# Patient Record
Sex: FEMALE | Race: WHITE | Employment: STUDENT | URBAN - METROPOLITAN AREA
[De-identification: names, ages, dates, MRNs, and addresses within clinical notes are randomized per-mention and may not be internally consistent; named-entity substitution may affect disease eponyms.]

---

## 2021-10-22 ENCOUNTER — HOSPITAL ENCOUNTER (OUTPATIENT)
Dept: PHYSICAL THERAPY | Age: 21
End: 2021-10-22
Payer: COMMERCIAL

## 2021-10-25 ENCOUNTER — APPOINTMENT (OUTPATIENT)
Dept: PHYSICAL THERAPY | Age: 21
End: 2021-10-25
Payer: COMMERCIAL

## 2021-10-27 ENCOUNTER — APPOINTMENT (OUTPATIENT)
Dept: PHYSICAL THERAPY | Age: 21
End: 2021-10-27
Payer: COMMERCIAL

## 2021-10-29 ENCOUNTER — HOSPITAL ENCOUNTER (OUTPATIENT)
Dept: PHYSICAL THERAPY | Age: 21
Discharge: HOME | End: 2021-10-29
Payer: COMMERCIAL

## 2021-10-29 PROCEDURE — 97110 THERAPEUTIC EXERCISES: CPT

## 2021-10-29 PROCEDURE — 97162 PT EVAL MOD COMPLEX 30 MIN: CPT

## 2021-10-29 NOTE — PROGRESS NOTES
Arline   : 2000  Primary: Aleja Hudson Rpn  Secondary:  [address] @ [address], [place].  [phone]   [phone]      OUTPATIENT PHYSICAL THERAPY: Daily Treatment Note  10/29/2021     GOALS: (Goals have been discussed and agreed upon with patient.)  Short-Term Functional Goals: Time Frame: 1 week  1. Patient will display independence with initial HEP to facilitate progress of condition. 2. Patient will note 10% improvement in capacity to walk uphill before onset of pain. Discharge Goals: Time Frame: 4 weeks  1. Patient will display normalized ROM of bilateral ankles to allow for more efficient stress pattern during ambulation. 2. Patient will display independence with final HEP to continue to progress condition. 3. Patient will display capacity to perform 15 SL Heel raises bilaterally to indicate appropriate calf strength for community ambulation. 4. Patient will demonstrate capacity to walk for two minutes on a Treadmill at max incline without pain to demonstrate improved community ambulatory capacity. _________________________________________________________________________  Pre-treatment Symptoms/Complaints:  See initial evaluation  Pain: Initial: 2/10 Post Session:  1/10   Medications Last Reviewed:  10/29/2021  Updated Objective Findings:  Below measures from initial evaluation unless otherwise noted. Observation/Orthostatic Postural Assessment:    Patient gait cycle unremarkable. Palpation:    Diffusely increased tone bilaterally throughout gastroc-soleus complex. ROM:    RLE:  CKC DF: 30 deg  PF: 55 deg  Inv.: 25 deg. Ev. 35 deg. LLE:  CKC DF: 30 deg  PF: 55 deg  Inv.: 25 deg. Ev. 35 deg. Strength:   RLE:  DF: 4+/5  PF: 4/5 (15 SL HR*)  Inv.: 4+/5  Ev. 4+/5    LLE:  DF: 4+/5  PF: 4/5 (17 SL HR*)  Inv.: 4+/5  Ev. 4+/5    Special Tests:    6MWT to be performed at next visit.    Neurological Screen:  Sensation to light touch WNL Braulio. Functional Mobility:   STS WNL. Balance:    SL Firm Flat Eyes Closed:  R: 8 sec. L: 4 sec. TREATMENT:   THERAPEUTIC ACTIVITY: ( see below for minutes): Therapeutic activities per grid below to improve mobility, strength, balance and coordination. Required minimal visual, verbal, manual and tactile cues to improve independence and safety with daily activities . THERAPEUTIC EXERCISE: (see below for minutes):  Exercises per grid below to improve mobility, strength, balance and coordination. Required minimal verbal and manual cues to promote proper body alignment, promote proper body posture and promote proper body mechanics. Progressed resistance, range, repetitions and complexity of movement as indicated. MANUAL THERAPY: (see below for minutes): Joint mobilization and Soft tissue mobilization was utilized and necessary because of the patient's restricted joint motion, painful spasm, loss of articular motion and restricted motion of soft tissue. MODALITIES: (see below for minutes):      to decrease pain    SELF CARE: (see below for minutes): Procedure(s) (per grid) utilized to improve and/or restore self-care/home management as related to dressing, bathing and grooming. Required minimal verbal cueing to facilitate activities of daily living skills and compensatory activities. Date: 10/29/21       Modalities:                                Therapeutic Exercise: 23 minutes       Slantboard Gastroc Stretch  3 x :30        Slantboard Soleus Stretch  3 x :30        Standing DF Leaning Against Wall  3 x 10        DL HR 3 x 10        Split Lunge DF with Front Foot Elevated 2 x 5                Proprioceptive Activities:                                Manual Therapy:                        Therapeutic Activities:                                  HEP: see 10/29/21 flow sheet for specifics.     [employer] Portal  Treatment/Session Summary:    · Response to Treatment: Patient is independent with performance of above described home program.  · Communication/Consultation:  None today  · Equipment provided today:  None today  · Recommendations/Intent for next treatment session: Next visit will focus on reaching out to MD, assessing tolerance to HEP and progressing mobility and endurance of lower leg musculature.     Total Treatment Billable Duration:  45 minutes  PT Patient Time In/Time Out  Time In: 7692  Time Out: Teri 29, PT    Future Appointments   Date Time Provider Khadijah Vadlez   11/1/2021  3:30 PM Clementeen Grams, PT [facility]   11/3/2021  3:30 PM Clementeen Grams, PT [facility]   11/5/2021  3:30 PM Clementeen Grams, PT [facility]   11/8/2021  3:30 PM Clementeen Grams, PT [facility]   11/10/2021  3:30 PM Clementeen Grams, PT [facility]   11/12/2021  3:30 PM Clementeen Grams, PT [facility]   11/15/2021  3:30 PM Clementeen Grams, PT [facility]   11/17/2021  3:30 PM Clementeen Grams, PT [facility]   11/19/2021  3:30 PM Clementeen Grams, PT [facility]   11/22/2021  3:30 PM Clementeen Grams, PT [facility]   11/24/2021  3:30 PM Clementeen Grams, PT [facility]   11/29/2021  3:30 PM Clementeen Grams, PT [facility]

## 2021-10-29 NOTE — THERAPY EVALUATION
Karin Mcdaniel : 2000 [id number] @ [address] [address]. [phone]   [phone] OUTPATIENT PHYSICAL THERAPY:Initial Assessment 10/29/2021 ICD-10: Treatment Diagnosis: Pain in right lower leg (M79.661), Pain in left lower leg (B83.985), Stiffness of right ankle, not elsewhere classified (M25.671) and Stiffness of left ankle, not elsewhere classified (M25.672) Precautions/Allergies:  
Patient has no allergy information on record. Seasonal 
Fall Risk Score:   
 Ambulatory/Rehab Services H2 Model Falls Risk Assessment Risk Factors: 
Click here to [employer] Ability to Rise from Chair: 
     (0)  Ability to rise in a single movement Falls Prevention Plan: No modifications necessary Total: (5 or greater = High Risk): 0  
 © [facility] [employer] Patent #[id number]. Federal Law prohibits the replication, distribution or use without written permission from [facility] MD Orders: evaluate and treat MEDICAL/REFERRING DIAGNOSIS: 
Pain in limb [M79.609] Disorder of muscle [M62.9] Other symptoms and signs involving the musculoskeletal system [R29.898] Cramp in limb [R25.2] DATE OF ONSET: 2021 REFERRING PHYSICIAN: Julio Kang, * RETURN PHYSICIAN APPOINTMENT: 2021 INITIAL ASSESSMENT:  Ms. Taylor Lind presents to clinic with complaints of insidious bilateral leg pain. Patient complains of pain and difficulty with the following functional activities: walking uphill, walking for prolonged periods in time, tasks requiring heel elevation. PROBLEM LIST (Impacting functional limitations): 1. Decreased Strength 2. Decreased ADL/Functional Activities 3. Decreased Ambulation Ability/Technique 4. Decreased Balance 5. Increased Pain 6. Decreased Activity Tolerance 7. Increased Fatigue 8. Decreased Flexibility/Joint Mobility 9.  Decreased [place] with Home Exercise Program INTERVENTIONS PLANNED: 
1. Balance Exercise 2. Gait Training 3. Home Exercise Program (HEP) 4. Manual Therapy 5. Neuromuscular Re-education/Strengthening 6. Range of Motion (ROM) 7. Therapeutic Activites 8. Therapeutic Exercise/Strengthening TREATMENT PLAN: 
Effective Dates: 10/29/2021 TO 11/28/2021 (30 days). Frequency/Duration: 3 times a week for 30 Days GOALS: (Goals have been discussed and agreed upon with patient.) Short-Term Functional Goals: Time Frame: 1 week 1. Patient will display independence with initial HEP to facilitate progress of condition. 2. Patient will note 10% improvement in capacity to walk uphill before onset of pain. Discharge Goals: Time Frame: 4 weeks 1. Patient will display normalized ROM of bilateral ankles to allow for more efficient stress pattern during ambulation. 2. Patient will display independence with final HEP to continue to progress condition. 3. Patient will display capacity to perform 15 SL Heel raises bilaterally to indicate appropriate calf strength for community ambulation. 4. Patient will demonstrate capacity to walk for two minutes on a Treadmill at max incline without pain to demonstrate improved community ambulatory capacity. Rehabilitation Potential For Stated Goals: Fair Regarding Arline Turner's therapy, I certify that the treatment plan above will be carried out by a therapist or under their direction. Thank you for this referral, 
Rodrigo Romeo PT Referring Physician Signature: Michael De La Rosa, *              Date HISTORY:  
History of Present Injury/Illness (Reason for Referral): 
Patient presents to clinic with complaints of chronic bilateral leg pain of insidious origin. Patient reports pain began when she returned to campus in 8/2021 shortly after her most recent MD follow up that went well.  Patient reports she noticed pain when walking uphill and describes pain as heavy/numb sensation that is diffuse and can progress to tingly when the temperature. Notes she has underwent one heart surgery in 2002 and outline history of condition Tetralogy of Fallot. Is unsure if leg pain is vascular in nature or muscular and has yet to return to MD managing condition as he is in [place]. States she received full workup at last [facility] in 8/2021 with nothing new to report and had not previously experienced these symptoms. Patient (through discussion) urged to set up appointment with MD when she will be home over break in December. Denies medication changes or sudden increase in activity. States she has always dealt with calf tightness and generalized fatigue/activity restrictions secondary to heart condition but this is new. Goals for PT are to increase capacity to navigate hills on campus and discern origin of problem. Past Medical History/Comorbidities: Ms. Sumanth Poole  has no past medical history on file. Ms. Sumanth Poole  has no past surgical history on file. Tetralogy of Fallot. Heart Surgery 2002. Social History/Living Environment:  
  Lives in apartment with 12 YRN. Prior Level of Function/Work/Activity: 
Light activity restrictions secondary to heart condition but otherwise no restrictions of PLOF. Dominant Side:  
      RIGHT Current Medications:  No current outpatient medications on file. Date Last Reviewed:  10/29/2021 # of Personal Factors/Comorbidities that affect the Plan of Care: 1-2: MODERATE COMPLEXITY EXAMINATION:  
Observation/Orthostatic Postural Assessment:   
Patient gait cycle unremarkable. Palpation:   
Diffusely increased tone bilaterally throughout gastroc-soleus complex. ROM:   
RLE: 
CKC DF: 30 deg PF: 55 deg Inv.: 25 deg. Ev. 35 deg. LLE: 
CKC DF: 30 deg PF: 55 deg Inv.: 25 deg. Ev. 35 deg. Strength:  
RLE: 
DF: 4+/5 PF: 4/5 (15 SL HR*) 
Inv.: 4+/5 Ev. 4+/5 LLE: 
DF: 4+/5 PF: 4/5 (17 SL HR*) 
Inv.: 4+/5 Ev. 4+/5 Special Tests: 6MWT to be performed at next visit. Neurological Screen: 
Sensation to light touch WNL Braulio. Functional Mobility: STS WNL. Balance:   
SL Firm Flat Eyes Closed: 
R: 8 sec. L: 4 sec. Body Structures Involved: 1. Nerves 2. Bones 3. Joints 4. Muscles 5. Ligaments Body Functions Affected: 1. Sensory/Pain 2. Neuromusculoskeletal 
3. Movement Related Activities and Participation Affected: 1. General Tasks and Demands 2. Mobility 3. Self Care 4. Domestic Life 5. Interpersonal Interactions and Relationships 6. Community, Social and [place] # of elements that affect the Plan of Care: 3: MODERATE COMPLEXITY CLINICAL PRESENTATION:  
Presentation: Evolving clinical presentation with changing clinical characteristics: MODERATE COMPLEXITY CLINICAL DECISION MAKING:  
Tool Used: Lower Extremity Functional Scale (LEFS) Score:  Initial: 72/80 Most Recent: X/80 (Date: -- ) Interpretation of Score: 20 questions each scored on a 5 point scale with 0 representing \"extreme difficulty or unable to perform\" and 4 representing \"no difficulty\". The lower the score, the greater the functional disability. 80/80 represents no disability. Minimal detectable change is 9 points. Medical Necessity:  
· Patient is expected to demonstrate progress in strength, range of motion, balance and coordination ·  to increase independence with navigating inclined surfaces. · . Reason for Services/Other Comments: 
· Patient has demonstrated an improvement in functional level by independent performance of HEP. · . Use of outcome tool(s) and clinical judgement create a POC that gives a: Questionable prediction of patient's progress: MODERATE COMPLEXITY Total Treatment Duration: 45 minutes Stacy Earl, PT

## 2021-11-01 ENCOUNTER — HOSPITAL ENCOUNTER (OUTPATIENT)
Dept: PHYSICAL THERAPY | Age: 21
Discharge: HOME | End: 2021-11-01
Payer: COMMERCIAL

## 2021-11-01 PROCEDURE — 97110 THERAPEUTIC EXERCISES: CPT

## 2021-11-01 PROCEDURE — 97112 NEUROMUSCULAR REEDUCATION: CPT

## 2021-11-01 NOTE — PROGRESS NOTES
Arline   : 2000  Primary: Sosa Hudson Rpn  Secondary:  [address] @ [address] [address].  [phone]   [phone]      OUTPATIENT PHYSICAL THERAPY: Daily Treatment Note  2021    ICD-10: Treatment Diagnosis: Pain in right lower leg (M79.661), Pain in left lower leg (M79.662), Stiffness of right ankle, not elsewhere classified (M25.671) and Stiffness of left ankle, not elsewhere classified (G79.832)    Effective Dates: 10/29/2021 TO 2021 (30 days). Frequency/Duration: 3 times a week for 30 Days    GOALS: (Goals have been discussed and agreed upon with patient.)  Short-Term Functional Goals: Time Frame: 1 week  1. Patient will display independence with initial HEP to facilitate progress of condition. 2. Patient will note 10% improvement in capacity to walk uphill before onset of pain. Discharge Goals: Time Frame: 4 weeks  1. Patient will display normalized ROM of bilateral ankles to allow for more efficient stress pattern during ambulation. 2. Patient will display independence with final HEP to continue to progress condition. 3. Patient will display capacity to perform 15 SL Heel raises bilaterally to indicate appropriate calf strength for community ambulation. 4. Patient will demonstrate capacity to walk for two minutes on a Treadmill at max incline without pain to demonstrate improved community ambulatory capacity. _________________________________________________________________________  Pre-treatment Symptoms/Complaints:  Patient reports doing excellent following last visit with no complaints of pain, just some mild DOMS in gastroc-soleus complex. Notes moderate participation in HEP secondary to weekend full of mid-terms. States she did not experience symptoms since last session as she has done well to avoid hills.    Pain: Initial: 2/10 Post Session:  1/10   Medications Last Reviewed:  2021  Updated Objective Findings:  Below measures from initial evaluation unless otherwise noted. Observation/Orthostatic Postural Assessment:    Patient gait cycle unremarkable. Palpation:    Diffusely increased tone bilaterally throughout gastroc-soleus complex. ROM:    RLE:  CKC DF: 30 deg  PF: 55 deg  Inv.: 25 deg. Ev. 35 deg. LLE:  CKC DF: 30 deg  PF: 55 deg  Inv.: 25 deg. Ev. 35 deg. Strength:   RLE:  DF: 4+/5  PF: 4/5 (15 SL HR*)  Inv.: 4+/5  Ev. 4+/5    LLE:  DF: 4+/5  PF: 4/5 (17 SL HR*)  Inv.: 4+/5  Ev. 4+/5    Special Tests:    6MWT to be performed at next visit. Neurological Screen:  Sensation to light touch WNL Braulio. Functional Mobility:   STS WNL. Balance:    SL Firm Flat Eyes Closed:  R: 8 sec. L: 4 sec. TREATMENT:   THERAPEUTIC ACTIVITY: ( see below for minutes): Therapeutic activities per grid below to improve mobility, strength, balance and coordination. Required minimal visual, verbal, manual and tactile cues to improve independence and safety with daily activities . THERAPEUTIC EXERCISE: (see below for minutes):  Exercises per grid below to improve mobility, strength, balance and coordination. Required minimal verbal and manual cues to promote proper body alignment, promote proper body posture and promote proper body mechanics. Progressed resistance, range, repetitions and complexity of movement as indicated. MANUAL THERAPY: (see below for minutes): Joint mobilization and Soft tissue mobilization was utilized and necessary because of the patient's restricted joint motion, painful spasm, loss of articular motion and restricted motion of soft tissue. MODALITIES: (see below for minutes):      to decrease pain    SELF CARE: (see below for minutes): Procedure(s) (per grid) utilized to improve and/or restore self-care/home management as related to dressing, bathing and grooming.  Required minimal verbal cueing to facilitate activities of daily living skills and compensatory activities. Date: 10/29/2021 11/1/2021      Modalities:                                Therapeutic Exercise: 23 minutes 30 minutes      Slantboard Gastroc Stretch  3 x :30  3 x :30      Slantboard Soleus Stretch  3 x :30  3 x :30       Standing DF Leaning Against Wall  3 x 10        DL HR 3 x 10        Split Lunge DF with Front Foot Elevated 2 x 5  x10       Wall Sit HR   2 x 10                                               Proprioceptive Activities:  15 minutes      BAPS: CW/CCW  x1' ea. B      SL Pendulum Swings: M/L, A/P  2 x 10 ea. @5#      SL Globes  2 x 20 @3.5#      Manual Therapy:                        Therapeutic Activities:                                  HEP: see 11/01/21 flow sheet for specifics. [employer] Portal  Treatment/Session Summary:  Patient displays excellent tolerance to today's treatment session with mild complaints of transient discomfort with strenuous tasks. Patient displays progressing tolerance to loading of gastroc-soleus complex without aggravation of condition. Continues to display limitations of strength, endurance and soft tissue extensibility and will benefit from skilled care accordingly. · Response to Treatment:  Patient is independent with performance of above described home program.  · Communication/Consultation:  None today  · Equipment provided today:  None today  · Recommendations/Intent for next treatment session:Continue to attempt communication to MD. Progress strength, endurance, and mobility of BLE.      Total Treatment Billable Duration:  45 minutes  PT Patient Time In/Time Out  Time In: [address]  Time Out: [address], PT    Future Appointments   Date Time Provider Khadijah Valdez   11/3/2021  3:30 PM Bogdan Dickey PT [facility]   11/5/2021  3:30 PM Bogdan Dickey PT [facility]   11/8/2021  3:30 PM Bogdan Dickey PT [facility]   11/10/2021  3:30 PM Bogdan Dickey PT [facility]   11/12/2021  3:30 PM Bogdan Dickey PT [facility]   11/15/2021  3:30 PM Darrelyn Gasmen, PT [facility]   11/17/2021  3:30 PM Darrelyn Gasmen, PT [facility]   11/19/2021  3:30 PM Darrelyn Gasmen, PT [facility]   11/22/2021  3:30 PM Darrelyn Gasmen, PT [facility]   11/24/2021  3:30 PM Darrelyn Gasmen, PT [facility]   11/29/2021  3:30 PM Darrelyn Gasmen, PT [facility]

## 2021-11-03 ENCOUNTER — HOSPITAL ENCOUNTER (OUTPATIENT)
Dept: PHYSICAL THERAPY | Age: 21
Discharge: HOME | End: 2021-11-03
Payer: COMMERCIAL

## 2021-11-03 PROCEDURE — 97110 THERAPEUTIC EXERCISES: CPT

## 2021-11-03 PROCEDURE — 97112 NEUROMUSCULAR REEDUCATION: CPT

## 2021-11-03 NOTE — PROGRESS NOTES
Arline   : 2000  Primary: Gypsy Hudson Rpn  Secondary:  Luz Elena Blue @ [place] [address] [facility]  [phone]   [phone]      OUTPATIENT PHYSICAL THERAPY: Daily Treatment Note  11/3/2021    ICD-10: Treatment Diagnosis: Pain in right lower leg (M79.661), Pain in left lower leg (M79.662), Stiffness of right ankle, not elsewhere classified (M25.671) and Stiffness of left ankle, not elsewhere classified (G17.779)    Effective Dates: 10/29/2021 TO 2021 (30 days). Frequency/Duration: 3 times a week for 30 Days    GOALS: (Goals have been discussed and agreed upon with patient.)  Short-Term Functional Goals: Time Frame: 1 week  1. Patient will display independence with initial HEP to facilitate progress of condition. 2. Patient will note 10% improvement in capacity to walk uphill before onset of pain. Discharge Goals: Time Frame: 4 weeks  1. Patient will display normalized ROM of bilateral ankles to allow for more efficient stress pattern during ambulation. 2. Patient will display independence with final HEP to continue to progress condition. 3. Patient will display capacity to perform 15 SL Heel raises bilaterally to indicate appropriate calf strength for community ambulation. 4. Patient will demonstrate capacity to walk for two minutes on a Treadmill at max incline without pain to demonstrate improved community ambulatory capacity. _________________________________________________________________________  Pre-treatment Symptoms/Complaints:  Patient reports doing well following last visit. Still has not encountered any hills since last visit. Able to go to gym and ride bike with good tolerance and is going more for volume than interval type work. States she has done elliptical in the past but it will irritate knees. Agreeable to try 10 minutes of elliptical to push WB volume with bike to follow.    Pain: Initial: 2/10 Post Session:  1/10   Medications Last Reviewed:  11/3/2021  Updated Objective Findings:  Below measures from initial evaluation unless otherwise noted. Observation/Orthostatic Postural Assessment:    Patient gait cycle unremarkable. Palpation:    Diffusely increased tone bilaterally throughout gastroc-soleus complex. ROM:    RLE:  CKC DF: 30 deg  PF: 55 deg  Inv.: 25 deg. Ev. 35 deg. LLE:  CKC DF: 30 deg  PF: 55 deg  Inv.: 25 deg. Ev. 35 deg. Strength:   RLE:  DF: 4+/5  PF: 4/5 (15 SL HR*)  Inv.: 4+/5  Ev. 4+/5    LLE:  DF: 4+/5  PF: 4/5 (17 SL HR*)  Inv.: 4+/5  Ev. 4+/5    Special Tests:    6MWT to be performed at next visit. Neurological Screen:  Sensation to light touch WNL Braulio. Functional Mobility:   STS WNL. Balance:    SL Firm Flat Eyes Closed:  R: 8 sec. L: 4 sec. TREATMENT:   THERAPEUTIC ACTIVITY: ( see below for minutes): Therapeutic activities per grid below to improve mobility, strength, balance and coordination. Required minimal visual, verbal, manual and tactile cues to improve independence and safety with daily activities . THERAPEUTIC EXERCISE: (see below for minutes):  Exercises per grid below to improve mobility, strength, balance and coordination. Required minimal verbal and manual cues to promote proper body alignment, promote proper body posture and promote proper body mechanics. Progressed resistance, range, repetitions and complexity of movement as indicated. MANUAL THERAPY: (see below for minutes): Joint mobilization and Soft tissue mobilization was utilized and necessary because of the patient's restricted joint motion, painful spasm, loss of articular motion and restricted motion of soft tissue. MODALITIES: (see below for minutes):      to decrease pain    SELF CARE: (see below for minutes): Procedure(s) (per grid) utilized to improve and/or restore self-care/home management as related to dressing, bathing and grooming.  Required minimal verbal cueing to facilitate activities of daily living skills and compensatory activities. Date: 10/29/2021 11/1/2021 11/3/2021     Modalities:                                Therapeutic Exercise: 23 minutes 30 minutes 30 minutes     Slantboard Gastroc Stretch  3 x :30  3 x :30 3 x :30     Slantboard Soleus Stretch  3 x :30  3 x :30  3 x :30      Standing DF Leaning Against Wall  3 x 10   2 x 15     DL HR 3 x 10   From Deficit  2 x 15     Split Lunge DF with Front Foot Elevated 2 x 5  x10  2 x 5     Wall Sit HR   2 x 10  2 x 15     Anterior Step Down   2 x 10 @6 in. Step B                                     Proprioceptive Activities:  15 minutes 15 minutes     BAPS: CW/CCW  x1' ea. B x1 ea. B   Lv. 3     SL Pendulum Swings: M/L, A/P  2 x 10 ea. @5# 2 x 20 ea. @5#     SL Balance  Globes  2 x 20 @3.5# Airex Globes  2 x 20 @3.5#                                     Manual Therapy:                        Therapeutic Activities:                                  HEP: see 10/29/2021 flow sheet for specifics. [employer] Portal  Treatment/Session Summary:  Patient displays good tolerance to today's treatment session with mild complaints of transient discomfort. Progressing strength and endurance of affected tissues without provocation of pain. Yet to test against asterisk sign of walking up hill. Will continue to benefit from skilled care to address persistent deficits. · Response to Treatment:  Patient is independent with performance of above described home program.  · Communication/Consultation:  None today  · Equipment provided today:  None today  · Recommendations/Intent for next treatment session:Progress endurance of gastroc-soleus complex as patient tolerates. Explore readiness to attempt hill walking.      Total Treatment Billable Duration:  45 minutes  Time in: 5363  Time out: 4363 [facility], PT    Future Appointments   Date Time Provider Khadijah Valdez   11/5/2021  3:30 PM Petra Romero, PT [facility] 11/8/2021  3:30 PM Evlyn Aakash, PT [facility]   11/10/2021  3:30 PM Evlyn Aakash, PT [facility]   11/12/2021  3:30 PM Evlyn Aakash, PT [facility]   11/15/2021  3:30 PM Evlyn Aakash, PT [facility]   11/17/2021  3:30 PM Evlyn Aakash, PT [facility]   11/19/2021  3:30 PM Evlyn Aakash, PT [facility]   11/22/2021  3:30 PM Evlyn Aakash, PT [facility]   11/24/2021  3:30 PM Evlyn Aakash, PT [facility]   11/29/2021  3:30 PM Evlyn Aakash, PT [facility]

## 2021-11-05 ENCOUNTER — HOSPITAL ENCOUNTER (OUTPATIENT)
Dept: PHYSICAL THERAPY | Age: 21
Discharge: HOME | End: 2021-11-05
Payer: COMMERCIAL

## 2021-11-05 PROCEDURE — 97112 NEUROMUSCULAR REEDUCATION: CPT

## 2021-11-05 PROCEDURE — 97110 THERAPEUTIC EXERCISES: CPT

## 2021-11-05 NOTE — PROGRESS NOTES
Arline   : 2000  Primary: Carmella Hudson Rpn  Secondary:  [address]  [phone]   [phone]      OUTPATIENT PHYSICAL THERAPY: Daily Treatment Note  2021    ICD-10: Treatment Diagnosis: Pain in right lower leg (M79.661), Pain in left lower leg (M79.662), Stiffness of right ankle, not elsewhere classified (M25.671) and Stiffness of left ankle, not elsewhere classified (Y80.769)    Effective Dates: 10/29/2021 TO 2021 (30 days). Frequency/Duration: 3 times a week for 30 Days    GOALS: (Goals have been discussed and agreed upon with patient.)  Short-Term Functional Goals: Time Frame: 1 week  1. Patient will display independence with initial HEP to facilitate progress of condition. 2. Patient will note 10% improvement in capacity to walk uphill before onset of pain. Discharge Goals: Time Frame: 4 weeks  1. Patient will display normalized ROM of bilateral ankles to allow for more efficient stress pattern during ambulation. 2. Patient will display independence with final HEP to continue to progress condition. 3. Patient will display capacity to perform 15 SL Heel raises bilaterally to indicate appropriate calf strength for community ambulation. 4. Patient will demonstrate capacity to walk for two minutes on a Treadmill at max incline without pain to demonstrate improved community ambulatory capacity. _________________________________________________________________________  Pre-treatment Symptoms/Complaints:  Patient reports doing well following last visit. No complaints of calf aggravation and continues to feel HEP is getting easier. Pain: Initial: 2/10 Post Session:  1/10   Medications Last Reviewed:  2021  Updated Objective Findings:  Below measures from initial evaluation unless otherwise noted.      Observation/Orthostatic Postural Assessment:    Patient gait cycle unremarkable. Palpation:    Diffusely increased tone bilaterally throughout gastroc-soleus complex. ROM:    RLE:  CKC DF: 30 deg  PF: 55 deg  Inv.: 25 deg. Ev. 35 deg. LLE:  CKC DF: 30 deg  PF: 55 deg  Inv.: 25 deg. Ev. 35 deg. Strength:   RLE:  DF: 4+/5  PF: 4/5 (15 SL HR*)  Inv.: 4+/5  Ev. 4+/5    LLE:  DF: 4+/5  PF: 4/5 (17 SL HR*)  Inv.: 4+/5  Ev. 4+/5    Special Tests:    6MWT to be performed at next visit. Neurological Screen:  Sensation to light touch WNL Braulio. Functional Mobility:   STS WNL. Balance:    SL Firm Flat Eyes Closed:  R: 8 sec. L: 4 sec. TREATMENT:   THERAPEUTIC ACTIVITY: ( see below for minutes): Therapeutic activities per grid below to improve mobility, strength, balance and coordination. Required minimal visual, verbal, manual and tactile cues to improve independence and safety with daily activities . THERAPEUTIC EXERCISE: (see below for minutes):  Exercises per grid below to improve mobility, strength, balance and coordination. Required minimal verbal and manual cues to promote proper body alignment, promote proper body posture and promote proper body mechanics. Progressed resistance, range, repetitions and complexity of movement as indicated. MANUAL THERAPY: (see below for minutes): Joint mobilization and Soft tissue mobilization was utilized and necessary because of the patient's restricted joint motion, painful spasm, loss of articular motion and restricted motion of soft tissue. MODALITIES: (see below for minutes):      to decrease pain    SELF CARE: (see below for minutes): Procedure(s) (per grid) utilized to improve and/or restore self-care/home management as related to dressing, bathing and grooming. Required minimal verbal cueing to facilitate activities of daily living skills and compensatory activities.      Date: 10/29/2021 11/1/2021 11/3/2021 11/5/2021  (Visit 4)    Modalities:                                Therapeutic Exercise: 23 minutes 30 minutes 30 minutes 35 minutes    Slantboard Gastroc Stretch  3 x :30  3 x :30 3 x :30 3 x :30     Slantboard Soleus Stretch  3 x :30  3 x :30  3 x :30  3 x :30     Standing DF Leaning Against Wall  3 x 10   2 x 15 2 x 15    DL HR 3 x 10   From Deficit  2 x 15 From Deficit  2 x 15    Split Lunge DF with Front Foot Elevated 2 x 5  x10  2 x 5     Wall Sit HR   2 x 10  2 x 15 2 x 15    Anterior Step Down   2 x 10 @6 in. Step B 2 x 10 @6 in. Step B       SL Matrix Balance Reach    On Airex  2 x 3 ea. Squat to Diagonal Reach     2 x 10 ea. Lateral Walks: Band at Forefoot    3 x 10 ft. Black TB            Proprioceptive Activities:  15 minutes 15 minutes 10 minutes    BAPS: CW/CCW  x1' ea. B x1 ea. B   Lv. 3 3 x :30 Static Balance: Lv. 2    SL Pendulum Swings: M/L, A/P  2 x 10 ea. @5# 2 x 20 ea. @5# 2 x 20 ea. @5#    SL Balance  Globes  2 x 20 @3.5# Airex Globes  2 x 20 @3.5#                                     Manual Therapy:                        Therapeutic Activities:                                  HEP: see 10/29/2021 flow sheet for specifics. [employer] Portal  Treatment/Session Summary:  Patient displays good tolerance to today's treatment session with mild complaints of knee and ankle discomfort with certain activities that are transient in nature. Does well to demonstrate progressing strength and endurance but ultimately remains limited in both categories. Will continue to benefit from skilled care accordingly. .   · Response to Treatment:  Patient is independent with performance of above described home program.  · Communication/Consultation:  None today  · Equipment provided today:  None today  · Recommendations/Intent for next treatment session: Trial TM incline walking if patient is feeling fresh.      Total Treatment Billable Duration:  45 minutes  PT Patient Time In/Time Out  Time In: 0774  Time Out: [address], PT    Future Appointments   Date Time Provider Khadijah Valdez   11/8/2021  3:30 PM Genell Furnish, PT [facility]   11/10/2021  3:30 PM Genell Furnish, PT [facility]   11/12/2021  3:30 PM Genell Furnish, PT [facility]   11/15/2021  3:30 PM Genell Furnish, PT [facility]   11/17/2021  3:30 PM Genell Furnish, PT [facility]   11/19/2021  3:30 PM Genell Furnish, PT [facility]   11/22/2021  3:30 PM Genell Furnish, PT [facility]   11/24/2021  3:30 PM Genell Furnish, PT [facility]   11/29/2021  3:30 PM Genell Furnish, PT [facility]

## 2021-11-08 ENCOUNTER — HOSPITAL ENCOUNTER (OUTPATIENT)
Dept: PHYSICAL THERAPY | Age: 21
Discharge: HOME | End: 2021-11-08
Payer: COMMERCIAL

## 2021-11-08 PROCEDURE — 97110 THERAPEUTIC EXERCISES: CPT

## 2021-11-08 PROCEDURE — 97112 NEUROMUSCULAR REEDUCATION: CPT

## 2021-11-08 PROCEDURE — 97530 THERAPEUTIC ACTIVITIES: CPT

## 2021-11-08 NOTE — PROGRESS NOTES
Arline   : 2000  Primary: Sascha Hudson Rpn  Secondary:  [address] @ [address] [place] [address], [address] [place] [address]  [phone]   [phone]      OUTPATIENT PHYSICAL THERAPY: Daily Treatment Note  2021    ICD-10: Treatment Diagnosis: Pain in right lower leg (M79.661), Pain in left lower leg (M79.662), Stiffness of right ankle, not elsewhere classified (M25.671) and Stiffness of left ankle, not elsewhere classified (I75.862)    Effective Dates: 10/29/2021 TO 2021 (30 days). Frequency/Duration: 3 times a week for 30 Days    GOALS: (Goals have been discussed and agreed upon with patient.)  Short-Term Functional Goals: Time Frame: 1 week  1. Patient will display independence with initial HEP to facilitate progress of condition. 2. Patient will note 10% improvement in capacity to walk uphill before onset of pain. Discharge Goals: Time Frame: 4 weeks  1. Patient will display normalized ROM of bilateral ankles to allow for more efficient stress pattern during ambulation. 2. Patient will display independence with final HEP to continue to progress condition. 3. Patient will display capacity to perform 15 SL Heel raises bilaterally to indicate appropriate calf strength for community ambulation. 4. Patient will demonstrate capacity to walk for two minutes on a Treadmill at max incline without pain to demonstrate improved community ambulatory capacity. _________________________________________________________________________  Pre-treatment Symptoms/Complaints:  Patient reports doing well following last visit. Notes she did have to rebound for an hour during practice earlier today so she is a bit tired. Pain: Initial: 2/10 Post Session:  1/10   Medications Last Reviewed:  2021  Updated Objective Findings:  Below measures from initial evaluation unless otherwise noted.      Observation/Orthostatic Postural Assessment:    Patient gait cycle unremarkable. Palpation:    Diffusely increased tone bilaterally throughout gastroc-soleus complex. ROM:    RLE:  CKC DF: 30 deg  PF: 55 deg  Inv.: 25 deg. Ev. 35 deg. LLE:  CKC DF: 30 deg  PF: 55 deg  Inv.: 25 deg. Ev. 35 deg. Strength:   RLE:  DF: 4+/5  PF: 4/5 (15 SL HR*)  Inv.: 4+/5  Ev. 4+/5    LLE:  DF: 4+/5  PF: 4/5 (17 SL HR*)  Inv.: 4+/5  Ev. 4+/5    Special Tests:    6MWT to be performed at next visit. Neurological Screen:  Sensation to light touch WNL Braulio. Functional Mobility:   STS WNL. Balance:    SL Firm Flat Eyes Closed:  R: 8 sec. L: 4 sec. TREATMENT:   THERAPEUTIC ACTIVITY: ( see below for minutes): Therapeutic activities per grid below to improve mobility, strength, balance and coordination. Required minimal visual, verbal, manual and tactile cues to improve independence and safety with daily activities . THERAPEUTIC EXERCISE: (see below for minutes):  Exercises per grid below to improve mobility, strength, balance and coordination. Required minimal verbal and manual cues to promote proper body alignment, promote proper body posture and promote proper body mechanics. Progressed resistance, range, repetitions and complexity of movement as indicated. MANUAL THERAPY: (see below for minutes): Joint mobilization and Soft tissue mobilization was utilized and necessary because of the patient's restricted joint motion, painful spasm, loss of articular motion and restricted motion of soft tissue. MODALITIES: (see below for minutes):      to decrease pain    SELF CARE: (see below for minutes): Procedure(s) (per grid) utilized to improve and/or restore self-care/home management as related to dressing, bathing and grooming. Required minimal verbal cueing to facilitate activities of daily living skills and compensatory activities.      Date: 10/29/2021 11/1/2021 11/3/2021 11/5/2021  (Visit 4) 11/8/2021  (Visit 5)   Modalities:                                Therapeutic Exercise: 23 minutes 30 minutes 30 minutes 35 minutes 20 minutes   Slantboard Gastroc Stretch  3 x :30  3 x :30 3 x :30 3 x :30  3 x :30   Slantboard Soleus Stretch  3 x :30  3 x :30  3 x :30  3 x :30  3 x :30   Standing DF Leaning Against Wall  3 x 10   2 x 15 2 x 15    DL HR 3 x 10   From Deficit  2 x 15 From Deficit  2 x 15    Split Lunge DF with Front Foot Elevated 2 x 5  x10  2 x 5  2 x 5   Wall Sit HR   2 x 10  2 x 15 2 x 15    Anterior Step Down   2 x 10 @6 in. Step B 2 x 10 @6 in. Step B       SL Matrix Balance Reach    On Airex  2 x 3 ea. Squat to Diagonal Reach     2 x 10 ea. Squat for DF with TRX Assist 2 x 10    Lateral Walks: Band at Forefoot    3 x 10 ft. Black TB            Proprioceptive Activities:  15 minutes 15 minutes 10 minutes 10 minutes   BAPS: CW/CCW  x1' ea. B x1 ea. B   Lv. 3 3 x :30 Static Balance: Lv. 2 X:30 ea. LV.3   SL Pendulum Swings: M/L, A/P  2 x 10 ea. @5# 2 x 20 ea. @5# 2 x 20 ea. @5#    SL Balance  Globes  2 x 20 @3.5# Airex Globes  2 x 20 @3.5#     Static Balance on Wobbleeboard     x2'                           Manual Therapy:                        Therapeutic Activities:     15 minutes   TM Incline Walking     x3'  Lv. 15 Incline  2.1 mph   Walking: Heels, toes, toes in, toes out, instep, outside     x10 yds. Ea.             HEP: see 10/29/2021 flow sheet for specifics. [employer] Portal  Treatment/Session Summary:  Patient displays good tolerance to today's treatment session without complaints of discomfort. Patient displays progressing capacity secondary to increased time walking up incline without onset of symptoms. Continues to display limitations in strength and endurance capacity of lower leg musculature. Will benefit from skilled car accordingly.    · Response to Treatment:  Patient is independent with performance of above described home program.  · Communication/Consultation:  None today  · Equipment provided today:  None today  · Recommendations/Intent for next treatment session: Progress intensity of TM incline walking if patient is feeling fresh.      Total Treatment Billable Duration:  45 minutes  PT Patient Time In/Time Out  Time In: 4046  Time Out: [address], PT    Future Appointments   Date Time Provider Khadijah Valdez   11/10/2021  3:30 PM Petra Mackintosh, PT [facility]   11/12/2021  3:30 PM Petra Mackintosh, PT [facility]   11/15/2021  3:30 PM Petra Mackintosh, PT [facility]   11/17/2021  3:30 PM Petra Mackintosh, PT [facility]   11/19/2021  3:30 PM Petra Mackintosh, PT [facility]   11/22/2021  3:30 PM Eptra Mackintosh, PT [facility]   11/24/2021  3:30 PM Petra Mackintosh, PT [facility]   11/29/2021  3:30 PM Petra Mackintosh, PT [facility]

## 2021-11-10 ENCOUNTER — HOSPITAL ENCOUNTER (OUTPATIENT)
Dept: PHYSICAL THERAPY | Age: 21
Discharge: HOME | End: 2021-11-10
Payer: COMMERCIAL

## 2021-11-10 PROCEDURE — 97110 THERAPEUTIC EXERCISES: CPT

## 2021-11-10 PROCEDURE — 97112 NEUROMUSCULAR REEDUCATION: CPT

## 2021-11-10 NOTE — PROGRESS NOTES
Arline   : 2000  Primary: Daniel Gerson Hudson Tian  Secondary:  Miguel Angel Hayes @ [address]  [address] [place] [address]  [phone]   [phone]      OUTPATIENT PHYSICAL THERAPY: Daily Treatment Note  11/10/2021    ICD-10: Treatment Diagnosis: Pain in right lower leg (M79.661), Pain in left lower leg (M79.662), Stiffness of right ankle, not elsewhere classified (M25.671) and Stiffness of left ankle, not elsewhere classified (D68.378)    Effective Dates: 10/29/2021 TO 2021 (30 days). Frequency/Duration: 3 times a week for 30 Days    GOALS: (Goals have been discussed and agreed upon with patient.)  Short-Term Functional Goals: Time Frame: 1 week  1. Patient will display independence with initial HEP to facilitate progress of condition. 2. Patient will note 10% improvement in capacity to walk uphill before onset of pain. Discharge Goals: Time Frame: 4 weeks  1. Patient will display normalized ROM of bilateral ankles to allow for more efficient stress pattern during ambulation. 2. Patient will display independence with final HEP to continue to progress condition. 3. Patient will display capacity to perform 15 SL Heel raises bilaterally to indicate appropriate calf strength for community ambulation. 4. Patient will demonstrate capacity to walk for two minutes on a Treadmill at max incline without pain to demonstrate improved community ambulatory capacity. _________________________________________________________________________  Pre-treatment Symptoms/Complaints:  Patient reports mild struggles following last visit secondary to job demands leading to 10 miles of walking in a day, up from usual 5-6. States generalized fatigue and HA today secondary to medication change. Pain: Initial: 2/10 Post Session:  1/10   Medications Last Reviewed:  11/10/2021  Updated Objective Findings:  Below measures from initial evaluation unless otherwise noted. Observation/Orthostatic Postural Assessment:    Patient gait cycle unremarkable. Palpation:    Diffusely increased tone bilaterally throughout gastroc-soleus complex. ROM:    RLE:  CKC DF: 30 deg  PF: 55 deg  Inv.: 25 deg. Ev. 35 deg. LLE:  CKC DF: 30 deg  PF: 55 deg  Inv.: 25 deg. Ev. 35 deg. Strength:   RLE:  DF: 4+/5  PF: 4/5 (15 SL HR*)  Inv.: 4+/5  Ev. 4+/5    LLE:  DF: 4+/5  PF: 4/5 (17 SL HR*)  Inv.: 4+/5  Ev. 4+/5    Special Tests:    6MWT to be performed at next visit. Neurological Screen:  Sensation to light touch WNL Braulio. Functional Mobility:   STS WNL. Balance:    SL Firm Flat Eyes Closed:  R: 8 sec. L: 4 sec. TREATMENT:   THERAPEUTIC ACTIVITY: ( see below for minutes): Therapeutic activities per grid below to improve mobility, strength, balance and coordination. Required minimal visual, verbal, manual and tactile cues to improve independence and safety with daily activities . THERAPEUTIC EXERCISE: (see below for minutes):  Exercises per grid below to improve mobility, strength, balance and coordination. Required minimal verbal and manual cues to promote proper body alignment, promote proper body posture and promote proper body mechanics. Progressed resistance, range, repetitions and complexity of movement as indicated. MANUAL THERAPY: (see below for minutes): Joint mobilization and Soft tissue mobilization was utilized and necessary because of the patient's restricted joint motion, painful spasm, loss of articular motion and restricted motion of soft tissue. MODALITIES: (see below for minutes):      to decrease pain    SELF CARE: (see below for minutes): Procedure(s) (per grid) utilized to improve and/or restore self-care/home management as related to dressing, bathing and grooming. Required minimal verbal cueing to facilitate activities of daily living skills and compensatory activities.      Date: 10/29/2021 11/1/2021 11/3/2021 11/5/2021  (Visit 4) 11/8/2021  (Visit 5) 11/10/2021  (Visit 6)    Modalities:                                        Therapeutic Exercise: 23 minutes 30 minutes 30 minutes 35 minutes 20 minutes 35 minutes    Slantboard Gastroc Stretch  3 x :30  3 x :30 3 x :30 3 x :30  3 x :30 3 x :30     Slantboard Soleus Stretch  3 x :30  3 x :30  3 x :30  3 x :30  3 x :30 3 x :30     Standing DF Leaning Against Wall  3 x 10   2 x 15 2 x 15  2 x 15     DL HR 3 x 10   From Deficit  2 x 15 From Deficit  2 x 15  From Deficit  2 x 15    Split Lunge DF with Front Foot Elevated 2 x 5  x10  2 x 5  2 x 5 2 x 10    Wall Sit HR   2 x 10  2 x 15 2 x 15  2 x 15    Anterior Step Down   2 x 10 @6 in. Step B 2 x 10 @6 in. Step B         SL Matrix Balance Reach    On Airex  2 x 3 ea. Squat to Diagonal Reach     2 x 10 ea. Squat for DF with TRX Assist 2 x 10      Lateral Walks: Band at Forefoot    3 x 10 ft. Black TB      Inverted BOSU Squats      2 x 10                                             Proprioceptive Activities:  15 minutes 15 minutes 10 minutes 10 minutes 10 minutes    BAPS: CW/CCW  x1' ea. B x1 ea. B   Lv. 3 3 x :30 Static Balance: Lv. 2 X:30 ea. LV.3 X:30 ea. Lv. 2    SL Pendulum Swings: M/L, A/P  2 x 10 ea. @5# 2 x 20 ea. @5# 2 x 20 ea. @5#      SL Balance  Globes  2 x 20 @3.5# Airex Globes  2 x 20 @3.5#       Static Balance     WB x2'     BOSU Lunges      Anterior   2 x 5 ea. Manual Therapy:                              Therapeutic Activities:     15 minutes     TM Incline Walking     x3'  Lv. 15 Incline  2.1 mph     Walking: Heels, toes, toes in, toes out, instep, outside     x10 yds. Ea.                 HEP: see 10/29/2021 flow sheet for specifics. [employer] Portal  Treatment/Session Summary:  Patient displays good tolerance to today's treatment despite initial complaints of fatigue. Does well to push balance system as well as well as mobility.    · Response to Treatment:  Patient is independent with performance of above described home program.  · Communication/Consultation:  None today  · Equipment provided today:  None today  · Recommendations/Intent for next treatment session: Light session with mobility focus as patient will once again likely have 10 miles of walking with occupational duties.      Total Treatment Billable Duration:  45 minutes  PT Patient Time In/Time Out  Time In: [address]  Time Out: [address], PT    Future Appointments   Date Time Provider Khadijah Valdez   11/12/2021  3:30 PM Krissy Oppenheim, PT [facility]   11/15/2021  3:30 PM Krissy Oppenheim, PT [facility]   11/17/2021  3:30 PM Krissy Oppenheim, PT [facility]   11/19/2021  3:30 PM Krissy Oppenheim, PT [facility]   11/22/2021  3:30 PM Krisys Oppenheim, PT [facility]   11/24/2021  3:30 PM Krissy Oppenheim, PT [facility]   11/29/2021  3:30 PM Krissy Oppenheim, PT [facility]

## 2021-11-12 ENCOUNTER — HOSPITAL ENCOUNTER (OUTPATIENT)
Dept: PHYSICAL THERAPY | Age: 21
Discharge: HOME | End: 2021-11-12
Payer: COMMERCIAL

## 2021-11-12 PROCEDURE — 97140 MANUAL THERAPY 1/> REGIONS: CPT

## 2021-11-12 PROCEDURE — 97110 THERAPEUTIC EXERCISES: CPT

## 2021-11-12 PROCEDURE — 97112 NEUROMUSCULAR REEDUCATION: CPT

## 2021-11-12 NOTE — PROGRESS NOTES
Arline   : 2000  Primary: Vaishali Hudson Phuongn  Secondary:  [address] @ [address], [place].  [phone]   [phone]      OUTPATIENT PHYSICAL THERAPY: Daily Treatment Note  2021    ICD-10: Treatment Diagnosis: Pain in right lower leg (M79.661), Pain in left lower leg (M79.662), Stiffness of right ankle, not elsewhere classified (M25.671) and Stiffness of left ankle, not elsewhere classified (M10.870)    Effective Dates: 10/29/2021 TO 2021 (30 days). Frequency/Duration: 3 times a week for 30 Days    GOALS: (Goals have been discussed and agreed upon with patient.)  Short-Term Functional Goals: Time Frame: 1 week  1. Patient will display independence with initial HEP to facilitate progress of condition. 2. Patient will note 10% improvement in capacity to walk uphill before onset of pain. Discharge Goals: Time Frame: 4 weeks  1. Patient will display normalized ROM of bilateral ankles to allow for more efficient stress pattern during ambulation. 2. Patient will display independence with final HEP to continue to progress condition. 3. Patient will display capacity to perform 15 SL Heel raises bilaterally to indicate appropriate calf strength for community ambulation. 4. Patient will demonstrate capacity to walk for two minutes on a Treadmill at max incline without pain to demonstrate improved community ambulatory capacity. _________________________________________________________________________  Pre-treatment Symptoms/Complaints:  Patient reports good tolerance to prior session with no complaints of pain. States feeling good today and was able to attempt hill that is primary symptom generator. States she did not notice appreciable change in symptom behavior with time to onset, symptom severity, or time to resolve. Adds she will have another 10 mile day tonight with game.    Pain: Initial: 2/10 Post Session:  1/10 Medications Last Reviewed:  11/12/2021  Updated Objective Findings:  Below measures from initial evaluation unless otherwise noted. Observation/Orthostatic Postural Assessment:    Patient gait cycle unremarkable. Palpation:    Diffusely increased tone bilaterally throughout gastroc-soleus complex. ROM:    RLE:  CKC DF: 30 deg  PF: 55 deg  Inv.: 25 deg. Ev. 35 deg. LLE:  CKC DF: 30 deg  PF: 55 deg  Inv.: 25 deg. Ev. 35 deg. Strength:   RLE:  DF: 4+/5  PF: 4/5 (15 SL HR*)  Inv.: 4+/5  Ev. 4+/5    LLE:  DF: 4+/5  PF: 4/5 (17 SL HR*)  Inv.: 4+/5  Ev. 4+/5    Special Tests:    6MWT to be performed at next visit. Neurological Screen:  Sensation to light touch WNL Braulio. Functional Mobility:   STS WNL. Balance:    SL Firm Flat Eyes Closed:  R: 8 sec. L: 4 sec. TREATMENT:   THERAPEUTIC ACTIVITY: ( see below for minutes): Therapeutic activities per grid below to improve mobility, strength, balance and coordination. Required minimal visual, verbal, manual and tactile cues to improve independence and safety with daily activities . THERAPEUTIC EXERCISE: (see below for minutes):  Exercises per grid below to improve mobility, strength, balance and coordination. Required minimal verbal and manual cues to promote proper body alignment, promote proper body posture and promote proper body mechanics. Progressed resistance, range, repetitions and complexity of movement as indicated. MANUAL THERAPY: (see below for minutes): Joint mobilization and Soft tissue mobilization was utilized and necessary because of the patient's restricted joint motion, painful spasm, loss of articular motion and restricted motion of soft tissue. MODALITIES: (see below for minutes):      to decrease pain    SELF CARE: (see below for minutes): Procedure(s) (per grid) utilized to improve and/or restore self-care/home management as related to dressing, bathing and grooming.  Required minimal verbal cueing to facilitate activities of daily living skills and compensatory activities. Date: 10/29/2021 11/1/2021 11/3/2021 11/5/2021  (Visit 4) 11/8/2021  (Visit 5) 11/10/2021  (Visit 6) 11/12/2021  (Visit 7)   Modalities:                                        Therapeutic Exercise: 23 minutes 30 minutes 30 minutes 35 minutes 20 minutes 35 minutes 20 minutes   Slantboard Gastroc Stretch  3 x :30  3 x :30 3 x :30 3 x :30  3 x :30 3 x :30  3 x :30   Slantboard Soleus Stretch  3 x :30  3 x :30  3 x :30  3 x :30  3 x :30 3 x :30  3 x :30    Standing DF Leaning Against Wall  3 x 10   2 x 15 2 x 15  2 x 15     DL HR 3 x 10   From Deficit  2 x 15 From Deficit  2 x 15  From Deficit  2 x 15    Split Lunge DF with Front Foot Elevated 2 x 5  x10  2 x 5  2 x 5 2 x 10 2 x 10   Wall Sit HR   2 x 10  2 x 15 2 x 15  2 x 15    Anterior Step Down   2 x 10 @6 in. Step B 2 x 10 @6 in. Step B      2 x 10 ea. SL Matrix Balance Reach    On Airex  2 x 3 ea. Sweeps   3 x 3   Squat to Diagonal Reach     2 x 10 ea. Squat for DF with TRX Assist 2 x 10      Lateral Walks: Band at Forefoot    3 x 10 ft. Black TB      Inverted BOSU Squats      2 x 10  2 x 10                                            Proprioceptive Activities:  15 minutes 15 minutes 10 minutes 10 minutes 10 minutes 15 minutes   BAPS: CW/CCW  x1' ea. B x1 ea. B   Lv. 3 3 x :30 Static Balance: Lv. 2 X:30 ea. LV.3 X:30 ea. Lv. 2 X:30 ea. Lv. 2   SL Pendulum Swings: M/L, A/P  2 x 10 ea. @5# 2 x 20 ea. @5# 2 x 20 ea. @5#      SL Balance  Globes  2 x 20 @3.5# Airex Globes  2 x 20 @3.5#    BOSU 3 x :20 ea. Static Balance     WB x2'     BOSU Lunges      Anterior   2 x 5 ea. Biodex Balance        2 Rounds ea. Manual Therapy:       10 minutes   TC Traction       Gr. 3 B   Posterior TC [place]       Gr. 3 B             Therapeutic Activities:     15 minutes     TM Incline Walking     x3'  Lv. 15 Incline  2.1 mph     Walking: Heels, toes, toes in, toes out, instep, outside     x10 yds.  Ea. HEP: see 10/29/2021 flow sheet for specifics. [employer] Portal  Treatment/Session Summary:  Patient displays good tolerance to today's treatment despite initial complaints of fatigue. Does well to show good progress with mobility. Will continue to benefit from skilled care to address persistent strength, mobility, and endurance deficits. · Response to Treatment:  Patient is independent with performance of above described home program.  · Communication/Consultation:  None today  · Equipment provided today:  None today  · Recommendations/Intent for next treatment session: Push on TM under fatigue.      Total Treatment Billable Duration:  45 minutes  PT Patient Time In/Time Out  Time In: [phone]  Time Out: [address], PT    Future Appointments   Date Time Provider Khadijah Valdez   11/15/2021  3:30 PM Eleonora Vargas, PT [facility]   11/17/2021  3:30 PM Eleonora Vargas, PT [facility]   11/19/2021  3:30 PM Eleonora Vargas, PT [facility]   11/22/2021  3:30 PM Eleonora Vargas, PT [facility]   11/24/2021  3:30 PM Eleonora Vargas, PT [facility]   11/29/2021  3:30 PM Eleonora Vargas, PT [facility]

## 2021-11-15 ENCOUNTER — HOSPITAL ENCOUNTER (OUTPATIENT)
Dept: PHYSICAL THERAPY | Age: 21
Discharge: HOME | End: 2021-11-15
Payer: COMMERCIAL

## 2021-11-15 PROCEDURE — 97112 NEUROMUSCULAR REEDUCATION: CPT

## 2021-11-15 PROCEDURE — 97530 THERAPEUTIC ACTIVITIES: CPT

## 2021-11-15 PROCEDURE — 97110 THERAPEUTIC EXERCISES: CPT

## 2021-11-15 NOTE — PROGRESS NOTES
Arline   : 2000  Primary: Sascha Hudson Phuongn  Secondary:  [address] @ [address], [place].  [phone]   [phone]      OUTPATIENT PHYSICAL THERAPY: Daily Treatment Note  11/15/2021    ICD-10: Treatment Diagnosis: Pain in right lower leg (M79.661), Pain in left lower leg (M79.662), Stiffness of right ankle, not elsewhere classified (M25.671) and Stiffness of left ankle, not elsewhere classified (M70.782)    Effective Dates: 10/29/2021 TO 2021 (30 days). Frequency/Duration: 3 times a week for 30 Days    GOALS: (Goals have been discussed and agreed upon with patient.)  Short-Term Functional Goals: Time Frame: 1 week  1. Patient will display independence with initial HEP to facilitate progress of condition. 2. Patient will note 10% improvement in capacity to walk uphill before onset of pain. Discharge Goals: Time Frame: 4 weeks  1. Patient will display normalized ROM of bilateral ankles to allow for more efficient stress pattern during ambulation. 2. Patient will display independence with final HEP to continue to progress condition. 3. Patient will display capacity to perform 15 SL Heel raises bilaterally to indicate appropriate calf strength for community ambulation. 4. Patient will demonstrate capacity to walk for two minutes on a Treadmill at max incline without pain to demonstrate improved community ambulatory capacity. _________________________________________________________________________  Pre-treatment Symptoms/Complaints:  Patient reports mild struggles following last visit secondary to job demands leading to 10 miles of walking in a day, up from usual 5-6. States generalized fatigue and HA today secondary to medication change. Pain: Initial: 2/10 Post Session:  1/10   Medications Last Reviewed:  11/15/2021  Updated Objective Findings:  Below measures from initial evaluation unless otherwise noted. Observation/Orthostatic Postural Assessment:    Patient gait cycle unremarkable. Palpation:    Diffusely increased tone bilaterally throughout gastroc-soleus complex. ROM:    RLE:  CKC DF: 30 deg  PF: 55 deg  Inv.: 25 deg. Ev. 35 deg. LLE:  CKC DF: 30 deg  PF: 55 deg  Inv.: 25 deg. Ev. 35 deg. Strength:   RLE:  DF: 4+/5  PF: 4/5 (15 SL HR*)  Inv.: 4+/5  Ev. 4+/5    LLE:  DF: 4+/5  PF: 4/5 (17 SL HR*)  Inv.: 4+/5  Ev. 4+/5    Special Tests:    6MWT to be performed at next visit. Neurological Screen:  Sensation to light touch WNL Braulio. Functional Mobility:   STS WNL. Balance:    SL Firm Flat Eyes Closed:  R: 8 sec. L: 4 sec. TREATMENT:   THERAPEUTIC ACTIVITY: ( see below for minutes): Therapeutic activities per grid below to improve mobility, strength, balance and coordination. Required minimal visual, verbal, manual and tactile cues to improve independence and safety with daily activities . THERAPEUTIC EXERCISE: (see below for minutes):  Exercises per grid below to improve mobility, strength, balance and coordination. Required minimal verbal and manual cues to promote proper body alignment, promote proper body posture and promote proper body mechanics. Progressed resistance, range, repetitions and complexity of movement as indicated. MANUAL THERAPY: (see below for minutes): Joint mobilization and Soft tissue mobilization was utilized and necessary because of the patient's restricted joint motion, painful spasm, loss of articular motion and restricted motion of soft tissue. MODALITIES: (see below for minutes):      to decrease pain    SELF CARE: (see below for minutes): Procedure(s) (per grid) utilized to improve and/or restore self-care/home management as related to dressing, bathing and grooming. Required minimal verbal cueing to facilitate activities of daily living skills and compensatory activities.      Date: 11/15/2021  Visit 8        Modalities: Therapeutic Exercise: 15 minutes        Slantboard Gastroc Stretch  3 x :30        Slantboard Soleus Stretch  3 x :30        Standing DF Leaning Against Wall  2 x 10        DL HR From Deficit 2 x 10         Split Lunge DF with Front Foot Elevated 2 x 10  1 step         Split Stance PF 2 x 10                                                                                          Proprioceptive Activities: 15 minutes        BAPS: CW/CCW x1' ea. SL Airex 3-Way Slides 2 x 5 ea. Static SL Balance On BOSU  3 x :20        BOSU Lunges                           Manual Therapy:                           Therapeutic Activities: 15 minutes        TM Incline Walking Inc: 15, Speed 3.0, x2'        TM Retro Walk/Push 2 x 1'        TM Sled Push with DF Focus 5 x 10 yds. @50#                   HEP: see 10/29/2021 flow sheet for specifics. [employer] Portal  Treatment/Session Summary:  Patient displays good tolerance to today's treatment with no complaints of pain, just strain that does not progress to point of discomfort. Does well to progress strength and endurance that ultimately remains limited. Will continue to benefit from skilled care to address persistent deficits in aforementioned categories. · Response to Treatment:  Patient is independent with performance of above described home program.  · Communication/Consultation:  None today  · Equipment provided today:  None today  · Recommendations/Intent for next treatment session: Assess patient tolerance to the [place].      Total Treatment Billable Duration:  45 minutes  PT Patient Time In/Time Out  Time In: [address]  Time Out: [address], PT    Future Appointments   Date Time Provider Khadijah Valdez   11/17/2021  3:30 PM Ari Alarcon PT [facility]   11/19/2021  3:30 PM Ari Alarcon PT [facility]   11/22/2021  3:30 PM Ari Alarcon PT [facility]   11/24/2021  3:30 PM Ari Alarcon PT [facility] 11/29/2021  3:30 PM Claudell Mock, PT [facility]

## 2021-11-17 ENCOUNTER — HOSPITAL ENCOUNTER (OUTPATIENT)
Dept: PHYSICAL THERAPY | Age: 21
Discharge: HOME | End: 2021-11-17
Payer: COMMERCIAL

## 2021-11-17 PROCEDURE — 97110 THERAPEUTIC EXERCISES: CPT

## 2021-11-17 PROCEDURE — 97140 MANUAL THERAPY 1/> REGIONS: CPT

## 2021-11-17 PROCEDURE — 97112 NEUROMUSCULAR REEDUCATION: CPT

## 2021-11-17 NOTE — PROGRESS NOTES
Arline   : 2000  Primary: Ludin Hudson Rpn  Secondary:  Kesha Jim @ [address]  [phone]   [phone]      OUTPATIENT PHYSICAL THERAPY: Daily Treatment Note  2021    ICD-10: Treatment Diagnosis: Pain in right lower leg (M79.661), Pain in left lower leg (M79.662), Stiffness of right ankle, not elsewhere classified (M25.671) and Stiffness of left ankle, not elsewhere classified (J74.625)    Effective Dates: 10/29/2021 TO 2021 (30 days). Frequency/Duration: 3 times a week for 30 Days    GOALS: (Goals have been discussed and agreed upon with patient.)  Short-Term Functional Goals: Time Frame: 1 week  1. Patient will display independence with initial HEP to facilitate progress of condition. 2. Patient will note 10% improvement in capacity to walk uphill before onset of pain. Discharge Goals: Time Frame: 4 weeks  1. Patient will display normalized ROM of bilateral ankles to allow for more efficient stress pattern during ambulation. 2. Patient will display independence with final HEP to continue to progress condition. 3. Patient will display capacity to perform 15 SL Heel raises bilaterally to indicate appropriate calf strength for community ambulation. 4. Patient will demonstrate capacity to walk for two minutes on a Treadmill at max incline without pain to demonstrate improved community ambulatory capacity. _________________________________________________________________________  Pre-treatment Symptoms/Complaints:  Patient reports good tolerance to last treatment session with no ill effects from pushing capacity. Will have a game tonight with 10 miles of walking scheduled as well as intense game and travel schedule upcoming. Notes she walked \"the hill\" today with similar narrative and no appreciable change.    Pain: Initial: 2/10 Post Session:  1/10   Medications Last Reviewed:  2021  Updated Objective Findings:  Below measures from initial evaluation unless otherwise noted. Observation/Orthostatic Postural Assessment:    Patient gait cycle unremarkable. Palpation:    Diffusely increased tone bilaterally throughout gastroc-soleus complex. ROM:    RLE:  CKC DF: 30 deg  PF: 55 deg  Inv.: 25 deg. Ev. 35 deg. LLE:  CKC DF: 30 deg  PF: 55 deg  Inv.: 25 deg. Ev. 35 deg. Strength:   RLE:  DF: 4+/5  PF: 4/5 (15 SL HR*)  Inv.: 4+/5  Ev. 4+/5    LLE:  DF: 4+/5  PF: 4/5 (17 SL HR*)  Inv.: 4+/5  Ev. 4+/5    Special Tests:    6MWT to be performed at next visit. Neurological Screen:  Sensation to light touch WNL Braulio. Functional Mobility:   STS WNL. Balance:    SL Firm Flat Eyes Closed:  R: 8 sec. L: 4 sec. TREATMENT:   THERAPEUTIC ACTIVITY: ( see below for minutes): Therapeutic activities per grid below to improve mobility, strength, balance and coordination. Required minimal visual, verbal, manual and tactile cues to improve independence and safety with daily activities . THERAPEUTIC EXERCISE: (see below for minutes):  Exercises per grid below to improve mobility, strength, balance and coordination. Required minimal verbal and manual cues to promote proper body alignment, promote proper body posture and promote proper body mechanics. Progressed resistance, range, repetitions and complexity of movement as indicated. MANUAL THERAPY: (see below for minutes): Joint mobilization and Soft tissue mobilization was utilized and necessary because of the patient's restricted joint motion, painful spasm, loss of articular motion and restricted motion of soft tissue. MODALITIES: (see below for minutes):      to decrease pain    SELF CARE: (see below for minutes): Procedure(s) (per grid) utilized to improve and/or restore self-care/home management as related to dressing, bathing and grooming.  Required minimal verbal cueing to facilitate activities of daily living skills and compensatory activities. Date: 11/15/2021  Visit 8 11/17/2021  Visit 9       Modalities:                                    Therapeutic Exercise: 15 minutes 20 minutes       Slantboard Gastroc Stretch  3 x :30 3 x :30       Slantboard Soleus Stretch  3 x :30 3 x :30       Standing DF Leaning Against Wall  2 x 10 3 x 5       DL HR From Deficit 2 x 10  3 x 5        Split Lunge DF with Front Foot Elevated 2 x 10  1 step  2 x 10   1 Step       Split Stance PF 2 x 10         Anterior Step Downs  6 in. Step  2 x 10                                                                                Proprioceptive Activities: 15 minutes 13 minutes       BAPS: CW/CCW x1' ea. x1' ea. SL Airex 3-Way Slides 2 x 5 ea. 2 x 5 ea. SL A/P Taps on Rockerboard  2 x 10 ea. Static SL Balance On BOSU  3 x :20        BOSU Lunges                           Manual Therapy:  12 minutes       GS IASTM with Movement  x8'       Posterior Tib. STM  x2'                Therapeutic Activities: 15 minutes        TM [address]: 15, Speed 3.0, x2'        TM Retro Walk/Push 2 x 1'        TM Sled Push with DF Focus 5 x 10 yds. @50#                   HEP: see 10/29/2021 flow sheet for specifics. [facility] Portal  Treatment/Session Summary:  Patient displays good tolerance to today's treatment with good tolerance to manual techniques lending to increased tissue extensibility. Continues to deal with intermittent bouts of tightness as well as persistent strength and endurance deficits that will continue to benefit from skilled care. · Response to Treatment:  Patient is independent with performance of above described home program.  · Communication/Consultation:  None today  · Equipment provided today:  None today  · Recommendations/Intent for next treatment session: Plan for absence with HEP updates.      Total Treatment Billable Duration:  45 minutes  PT Patient Time In/Time Out  Time In: 1530  Time Out: [address], PT    Future Appointments   Date Time Provider Khadijah Valdez   11/19/2021  3:30 PM Deneice Backer, PT [facility]   11/22/2021  3:30 PM Deneice Backer, PT [facility]   11/24/2021  3:30 PM Deneice Backer, PT [facility]   11/29/2021  3:30 PM Deneice Backer, PT [facility]

## 2021-11-19 ENCOUNTER — HOSPITAL ENCOUNTER (OUTPATIENT)
Dept: PHYSICAL THERAPY | Age: 21
Discharge: HOME | End: 2021-11-19
Payer: COMMERCIAL

## 2021-11-19 PROCEDURE — 97112 NEUROMUSCULAR REEDUCATION: CPT

## 2021-11-19 PROCEDURE — 97110 THERAPEUTIC EXERCISES: CPT

## 2021-11-19 PROCEDURE — 97140 MANUAL THERAPY 1/> REGIONS: CPT

## 2021-11-19 NOTE — PROGRESS NOTES
Arline   : 2000  Primary: Ricardo Hudson Phuongn  Secondary:  Michelle Delgado @ [address], [place].  [phone]   [phone]      OUTPATIENT PHYSICAL THERAPY: Daily Treatment Note  2021    ICD-10: Treatment Diagnosis: Pain in right lower leg (M79.661), Pain in left lower leg (M79.662), Stiffness of right ankle, not elsewhere classified (M25.671) and Stiffness of left ankle, not elsewhere classified (Q01.506)    Effective Dates: 10/29/2021 TO 2021 (30 days). Frequency/Duration: 3 times a week for 30 Days    GOALS: (Goals have been discussed and agreed upon with patient.)  Short-Term Functional Goals: Time Frame: 1 week  1. Patient will display independence with initial HEP to facilitate progress of condition. 2. Patient will note 10% improvement in capacity to walk uphill before onset of pain. Discharge Goals: Time Frame: 4 weeks  1. Patient will display normalized ROM of bilateral ankles to allow for more efficient stress pattern during ambulation. 2. Patient will display independence with final HEP to continue to progress condition. 3. Patient will display capacity to perform 15 SL Heel raises bilaterally to indicate appropriate calf strength for community ambulation. 4. Patient will demonstrate capacity to walk for two minutes on a Treadmill at max incline without pain to demonstrate improved community ambulatory capacity. _________________________________________________________________________  Pre-treatment Symptoms/Complaints:  Patient reports good tolerance to prior visit with no complaints of pain, just strain. Does note she did do 10 miles on the bike last night and is sore with DOMS in BLE generally. Pain: Initial: 2/10 Post Session:  1/10   Medications Last Reviewed:  2021  Updated Objective Findings:  Below measures from initial evaluation unless otherwise noted.      Observation/Orthostatic Postural Assessment:    Patient gait cycle unremarkable. Palpation:    Diffusely increased tone bilaterally throughout gastroc-soleus complex. ROM:    RLE:  CKC DF: 30 deg  PF: 55 deg  Inv.: 25 deg. Ev. 35 deg. LLE:  CKC DF: 30 deg  PF: 55 deg  Inv.: 25 deg. Ev. 35 deg. Strength:   RLE:  DF: 4+/5  PF: 4/5 (15 SL HR*)  Inv.: 4+/5  Ev. 4+/5    LLE:  DF: 4+/5  PF: 4/5 (17 SL HR*)  Inv.: 4+/5  Ev. 4+/5    Special Tests:    6MWT to be performed at next visit. Neurological Screen:  Sensation to light touch WNL Braulio. Functional Mobility:   STS WNL. Balance:    SL Firm Flat Eyes Closed:  R: 8 sec. L: 4 sec. TREATMENT:   THERAPEUTIC ACTIVITY: ( see below for minutes): Therapeutic activities per grid below to improve mobility, strength, balance and coordination. Required minimal visual, verbal, manual and tactile cues to improve independence and safety with daily activities . THERAPEUTIC EXERCISE: (see below for minutes):  Exercises per grid below to improve mobility, strength, balance and coordination. Required minimal verbal and manual cues to promote proper body alignment, promote proper body posture and promote proper body mechanics. Progressed resistance, range, repetitions and complexity of movement as indicated. MANUAL THERAPY: (see below for minutes): Joint mobilization and Soft tissue mobilization was utilized and necessary because of the patient's restricted joint motion, painful spasm, loss of articular motion and restricted motion of soft tissue. MODALITIES: (see below for minutes):      to decrease pain    SELF CARE: (see below for minutes): Procedure(s) (per grid) utilized to improve and/or restore self-care/home management as related to dressing, bathing and grooming. Required minimal verbal cueing to facilitate activities of daily living skills and compensatory activities.      Date: 11/15/2021  Visit 8 11/17/2021  Visit 9 11/19/2021  Visit 10       Modalities: Therapeutic Exercise: 15 minutes 20 minutes 20 minutes      Slantboard Gastroc Stretch  3 x :30 3 x :30 3 x :30      Slantboard Soleus Stretch  3 x :30 3 x :30 3 x :30       Standing DF Leaning Against Wall  2 x 10 3 x 5       DL HR From Deficit 2 x 10  3 x 5        Split Lunge DF with Front Foot Elevated 2 x 10  1 step  2 x 10   1 Step 2 x 10   2 Steps      Split Stance PF 2 x 10         Anterior Step Downs  6 in. Step  2 x 10  3-Way 6 in.  2 x 5 ea. Proprioceptive Activities: 15 minutes 13 minutes 10 minutes      BAPS: CW/CCW x1' ea. x1' ea.  x1' ea      SL Airex 3-Way Slides 2 x 5 ea. 2 x 5 ea. SL A/P Taps on Rockerboard  2 x 10 ea. Static SL Balance On BOSU  3 x :20  Airex  3 x :30 ea. BOSU Lunges                           Manual Therapy:  12 minutes 15 minutes      GS IASTM with Movement  x8' x8'      Posterior Tib. STM  x2' x4'               Therapeutic Activities: 15 minutes        TM [address]: 15, Speed 3.0, x2'        TM Retro Walk/Push 2 x 1'        TM Sled Push with DF Focus 5 x 10 yds. @50#                   HEP: see 10/29/2021 flow sheet for specifics. [employer] Portal  Treatment/Session Summary:  Patient displays good tolerance to today's treatment with increased tone in GS complex reducing following manual intervention. Continues to display decreased strength, mobility and endurance limiting capacity and will benefit from continues skilled care accordingly. · Response to Treatment:  Patient is independent with performance of above described home program.  · Communication/Consultation:  None today  · Equipment provided today:  None today  · Recommendations/Intent for next treatment session: Assess tolerance to weekend.      Total Treatment Billable Duration:  45 minutes  PT Patient Time In/Time Out  Time In: 1530  Time Out: [address], PT    Future Appointments   Date Time Provider Khadijah Medinai   11/22/2021  3:30 PM Deneice Backer, PT [facility]   11/24/2021  3:30 PM Deneice Backer, PT [facility]   11/29/2021  3:30 PM Deneice Backer, PT [facility]   12/3/2021  3:30 PM Deneice Backer, PT [facility]   12/7/2021  3:30 PM Deneice Backer, PT [facility]   12/9/2021  3:30 PM Deneice Backer, PT [facility]   12/14/2021  3:30 PM Deneice Backer, PT [facility]   12/16/2021  3:30 PM Deneice Backer, PT [facility]   12/21/2021  3:30 PM Deneice Backer, PT [facility]   12/28/2021  3:30 PM Deneice Backer, PT [facility]   12/30/2021  3:30 PM Deneice Backer, PT [facility]

## 2021-11-22 ENCOUNTER — APPOINTMENT (OUTPATIENT)
Dept: PHYSICAL THERAPY | Age: 21
End: 2021-11-22
Payer: COMMERCIAL

## 2021-11-24 ENCOUNTER — APPOINTMENT (OUTPATIENT)
Dept: PHYSICAL THERAPY | Age: 21
End: 2021-11-24
Payer: COMMERCIAL

## 2021-11-26 ENCOUNTER — APPOINTMENT (OUTPATIENT)
Dept: PHYSICAL THERAPY | Age: 21
End: 2021-11-26
Payer: COMMERCIAL

## 2021-11-29 ENCOUNTER — HOSPITAL ENCOUNTER (OUTPATIENT)
Dept: PHYSICAL THERAPY | Age: 21
Discharge: HOME | End: 2021-11-29
Payer: COMMERCIAL

## 2021-11-29 PROCEDURE — 97140 MANUAL THERAPY 1/> REGIONS: CPT

## 2021-11-29 PROCEDURE — 97110 THERAPEUTIC EXERCISES: CPT

## 2021-11-29 PROCEDURE — 97112 NEUROMUSCULAR REEDUCATION: CPT

## 2021-11-29 NOTE — PROGRESS NOTES
Arline   : 2000  Primary: Babatunde Tran Malvinrebel Tian  Secondary:  [id number] @ [address] [place] [address].  [phone]   [phone]      OUTPATIENT PHYSICAL THERAPY: Daily Treatment Note  2021    ICD-10: Treatment Diagnosis: Pain in right lower leg (M79.661), Pain in left lower leg (M79.662), Stiffness of right ankle, not elsewhere classified (M25.671) and Stiffness of left ankle, not elsewhere classified (J89.287)    Effective Dates: 10/29/2021 TO 2021 (30 days). Frequency/Duration: 3 times a week for 30 Days    GOALS: (Goals have been discussed and agreed upon with patient.)  Short-Term Functional Goals: Time Frame: 1 week  1. Patient will display independence with initial HEP to facilitate progress of condition. 2. Patient will note 10% improvement in capacity to walk uphill before onset of pain. Discharge Goals: Time Frame: 4 weeks  1. Patient will display normalized ROM of bilateral ankles to allow for more efficient stress pattern during ambulation. 2. Patient will display independence with final HEP to continue to progress condition. 3. Patient will display capacity to perform 15 SL Heel raises bilaterally to indicate appropriate calf strength for community ambulation. 4. Patient will demonstrate capacity to walk for two minutes on a Treadmill at max incline without pain to demonstrate improved community ambulatory capacity. _________________________________________________________________________  Pre-treatment Symptoms/Complaints:  Patient reports generalized fatigue today secondary to demanding travel schedule and poor sleep as of late. Legs generally holding up well with duration of recent trip with exception of incidence of excessive stair navigation.    Pain: Initial: 2/10 Post Session:  1/10   Medications Last Reviewed:  2021  Updated Objective Findings:  Below measures from initial evaluation unless otherwise noted. Observation/Orthostatic Postural Assessment:    Patient gait cycle unremarkable. Palpation:    Diffusely increased tone bilaterally throughout gastroc-soleus complex. ROM:    RLE:  CKC DF: 30 deg  PF: 55 deg  Inv.: 25 deg. Ev. 35 deg. LLE:  CKC DF: 30 deg  PF: 55 deg  Inv.: 25 deg. Ev. 35 deg. Strength:   RLE:  DF: 4+/5  PF: 4/5 (15 SL HR*)  Inv.: 4+/5  Ev. 4+/5    LLE:  DF: 4+/5  PF: 4/5 (17 SL HR*)  Inv.: 4+/5  Ev. 4+/5    Special Tests:    6MWT to be performed at next visit. Neurological Screen:  Sensation to light touch WNL Braulio. Functional Mobility:   STS WNL. Balance:    SL Firm Flat Eyes Closed:  R: 8 sec. L: 4 sec. TREATMENT:   THERAPEUTIC ACTIVITY: ( see below for minutes): Therapeutic activities per grid below to improve mobility, strength, balance and coordination. Required minimal visual, verbal, manual and tactile cues to improve independence and safety with daily activities . THERAPEUTIC EXERCISE: (see below for minutes):  Exercises per grid below to improve mobility, strength, balance and coordination. Required minimal verbal and manual cues to promote proper body alignment, promote proper body posture and promote proper body mechanics. Progressed resistance, range, repetitions and complexity of movement as indicated. MANUAL THERAPY: (see below for minutes): Joint mobilization and Soft tissue mobilization was utilized and necessary because of the patient's restricted joint motion, painful spasm, loss of articular motion and restricted motion of soft tissue. MODALITIES: (see below for minutes):      to decrease pain    SELF CARE: (see below for minutes): Procedure(s) (per grid) utilized to improve and/or restore self-care/home management as related to dressing, bathing and grooming. Required minimal verbal cueing to facilitate activities of daily living skills and compensatory activities.      Date: 11/15/2021  Visit 8 11/17/2021  Visit 9 11/19/2021  Visit 10  11/29/2021  (Visit 11)     Modalities:                                    Therapeutic Exercise: 15 minutes 20 minutes 20 minutes 22 minutes     Slantboard Gastroc Stretch  3 x :30 3 x :30 3 x :30 3 x :30     Slantboard Soleus Stretch  3 x :30 3 x :30 3 x :30  3 x :30     Standing DF Leaning Against Wall  2 x 10 3 x 5  2 x 10      DL HR From Deficit 2 x 10  3 x 5   2 x 10      Split Lunge DF with Front Foot Elevated 2 x 10  1 step  2 x 10   1 Step 2 x 10   2 Steps 20 x 10   @2 Steps     Split Stance PF 2 x 10         Anterior Step Downs  6 in. Step  2 x 10  3-Way 6 in.  2 x 5 ea. Ant/Retro  2 x 5 ea. B                                                                             Proprioceptive Activities: 15 minutes 13 minutes 10 minutes 11 minutes     BAPS: CW/CCW x1' ea. x1' ea.  x1' ea x1' ea. SL Airex 3-Way Slides 2 x 5 ea. 2 x 5 ea. SL A/P Taps on Rockerboard  2 x 10 ea. Static SL Balance On BOSU  3 x :20  Airex  3 x :30 ea. Airex  3 x :30 ea. BOSU Lunges                           Manual Therapy:  12 minutes 15 minutes 12 minutes     GS IASTM with Movement  x8' x8' x8'     Posterior Tib. STM  x2' x4' x4'               Therapeutic Activities: 15 minutes        TM [address]: 15, Speed 3.0, x2'        TM Retro Walk/Push 2 x 1'        TM Sled Push with DF Focus 5 x 10 yds. @50#                   HEP: see 10/29/2021 flow sheet for specifics. [employer] Portal  Treatment/Session Summary:  Patient displays good tolerance to today's treatment session, albeit at lesser vigor. Does well to display increased soft tissue extensibility with manual techniques. Will continue to benefit from skilled care to address persistent deficits of strength, motor control, and mobility of lower legs.    · Response to Treatment:  Patient is independent with performance of above described home program.  · Communication/Consultation:  None today  · Equipment provided today:  None today  · Recommendations/Intent for next treatment session: POC.      Total Treatment Billable Duration:  45 minutes  PT Patient Time In/Time Out  [facility]  Time Out: [address], PT    Future Appointments   Date Time Provider Khadijah Valdez   12/3/2021  3:30 PM Petra Romero, PT [facility]   12/7/2021  3:30 PM Petravan Romero, PT [facility]   12/9/2021  3:30 PM Petra Romero, PT [facility]   12/14/2021  3:30 PM Petra Heather, PT [facility]   12/16/2021  3:30 PM Petravan Romero, PT [facility]   12/30/2021  3:30 PM Petra Heather, PT [facility]

## 2021-12-03 ENCOUNTER — HOSPITAL ENCOUNTER (OUTPATIENT)
Dept: PHYSICAL THERAPY | Age: 21
Discharge: HOME | End: 2021-12-03
Payer: COMMERCIAL

## 2021-12-03 PROCEDURE — 97112 NEUROMUSCULAR REEDUCATION: CPT

## 2021-12-03 PROCEDURE — 97110 THERAPEUTIC EXERCISES: CPT

## 2021-12-03 PROCEDURE — 97140 MANUAL THERAPY 1/> REGIONS: CPT

## 2021-12-03 NOTE — PROGRESS NOTES
Arline   : 2000  Primary: Gumaro Hudson Tian  Secondary:  [address] @ [address], [facility] BHANU Soria.  [phone]   [phone]      OUTPATIENT PHYSICAL THERAPY: Daily Treatment Note  12/3/2021    ICD-10: Treatment Diagnosis: Pain in right lower leg (M79.661), Pain in left lower leg (M79.662), Stiffness of right ankle, not elsewhere classified (M25.671) and Stiffness of left ankle, not elsewhere classified (S46.664)    Effective Dates: 12/3/2021 TO 2022 (30 days). Frequency/Duration: 2 times a week for 30 Days    GOALS: (Goals have been discussed and agreed upon with patient.)  Short-Term Functional Goals: Time Frame: 1 week  1. Patient will display independence with initial HEP to facilitate progress of condition. Goal met. 2. Patient will note 10% improvement in capacity to walk uphill before onset of pain. In progress. Discharge Goals: Time Frame: 4 weeks  1. Patient will display normalized ROM of bilateral ankles to allow for more efficient stress pattern during ambulation. Goal partially met. 2. Patient will display independence with final HEP to continue to progress condition. In progress. 3. Patient will display capacity to perform 15 SL Heel raises bilaterally to indicate appropriate calf strength for community ambulation. Goal met. 4. Patient will demonstrate capacity to walk for two minutes on a Treadmill at max incline without pain to demonstrate improved community ambulatory capacity. In progress. _________________________________________________________________________  Pre-treatment Symptoms/Complaints:  Patient reports strange sensation in left calf with pop felt and generalized weakness that while mild was still somewhat concerning. Additionally reports generalized fatigue. Overall feels she is progressing condition but has not encountered hill walking recently to test condition.    Pain: Initial: 1/10 Post Session:  1/10   Medications Last Reviewed:  12/3/2021  Updated Objective Findings:  Below measures from 12/3/2021 unless otherwise indicated. LEFS: 67/80    Observation/Orthostatic Postural Assessment:    Patient gait cycle unremarkable. Palpation:    Diffusely increased tone bilaterally throughout gastroc-soleus complex. TTP at lateral head of left gastroc and bilateral posterior tib. muscle belly. ROM:    RLE:  CKC DF: 40 deg  PF: 55 deg  Inv.: 25 deg. Ev. 35 deg. LLE:  CKC DF: 40 deg  PF: 55 deg  Inv.: 25 deg. Ev. 35 deg. Strength:   RLE:  DF: 4+/5  PF: 4+/5 (20 SL HR*)  Inv.: 5/5  Ev. 5/5    LLE:  DF: 4+/5  PF: 4+/5 (20 SL HR*)  Inv.: 5/5  Ev. 5/5    Special Tests:    6MWT avoided today secondary to generalized fatigue. Neurological Screen:  Sensation to light touch WNL Braulio. Functional Mobility:   Anterior Step Down Test: Max REsp in 30 seconds from 6 in. Step  R: 15 L: 16   Balance:    SL Airex Eyes Closed: Unshod  R: 5 sec. L: 3 sec. TREATMENT:   THERAPEUTIC ACTIVITY: ( see below for minutes): Therapeutic activities per grid below to improve mobility, strength, balance and coordination. Required minimal visual, verbal, manual and tactile cues to improve independence and safety with daily activities . THERAPEUTIC EXERCISE: (see below for minutes):  Exercises per grid below to improve mobility, strength, balance and coordination. Required minimal verbal and manual cues to promote proper body alignment, promote proper body posture and promote proper body mechanics. Progressed resistance, range, repetitions and complexity of movement as indicated. MANUAL THERAPY: (see below for minutes): Joint mobilization and Soft tissue mobilization was utilized and necessary because of the patient's restricted joint motion, painful spasm, loss of articular motion and restricted motion of soft tissue.    MODALITIES: (see below for minutes):      to decrease pain    SELF CARE: (see below for minutes): Procedure(s) (per grid) utilized to improve and/or restore self-care/home management as related to dressing, bathing and grooming. Required minimal verbal cueing to facilitate activities of daily living skills and compensatory activities. Date: 11/15/2021  Visit 8 11/17/2021  Visit 9 11/19/2021  Visit 10  11/29/2021  (Visit 11) 12/3/2021  (Visit 12)    Modalities:                                    Therapeutic Exercise: 15 minutes 20 minutes 20 minutes 22 minutes 22 minutes    Slantboard Gastroc Stretch  3 x :30 3 x :30 3 x :30 3 x :30 3 x :30     Slantboard Soleus Stretch  3 x :30 3 x :30 3 x :30  3 x :30 3 x :30     Standing DF Leaning Against Wall  2 x 10 3 x 5  2 x 10  2 x 10     DL HR From Deficit 2 x 10  3 x 5   2 x 10  SL HR x20 ea. Split Lunge DF with Front Foot Elevated 2 x 10  1 step  2 x 10   1 Step 2 x 10   2 Steps 20 x 10   @2 Steps     Split Stance PF 2 x 10         Anterior Step Downs  6 in. Step  2 x 10  3-Way 6 in.  2 x 5 ea. Ant/Retro  2 x 5 ea. B x20 ea. Proprioceptive Activities: 15 minutes 13 minutes 10 minutes 11 minutes 11 minutes    BAPS: CW/CCW x1' ea. x1' ea.  x1' ea x1' ea.  x1' ea. SL Airex 3-Way Slides 2 x 5 ea. 2 x 5 ea. SL A/P Taps on Rockerboard  2 x 10 ea. Static SL Balance On BOSU  3 x :20  Airex  3 x :30 ea. Airex  3 x :30 ea. Airex, unshod  3 x :30 ea. BOSU Lunges                           Manual Therapy:  12 minutes 15 minutes 12 minutes 12 minutes    GS IASTM with Movement  x8' x8' x8' x8'    Posterior Tib. STM  x2' x4' x4'  x4'              Therapeutic Activities: 15 minutes        TM [address]: 15, Speed 3.0, x2'        TM Retro Walk/Push 2 x 1'        TM Sled Push with DF Focus 5 x 10 yds. @50#                   HEP: see 10/29/2021 flow sheet for specifics.     [employer] Portal  Treatment/Session Summary:  Patient displays progress of condition with increased strength and mobility at lower leg musculature. Continues to struggle with intermittent discomfort and generalize fatigue stemming from non-MSK ailments. Will continue to benefit from skilled care to address aforementioned deficits and restore PLOF. · Response to Treatment:  Patient is independent with performance of above described home program.  · Communication/Consultation:  None today  · Equipment provided today:  None today  · Recommendations/Intent for next treatment session: 6MWT.      Total Treatment Billable Duration:  45 minutes  PT Patient Time In/Time Out  Time In: [facility]  Time Out: [address], PT    Future Appointments   Date Time Provider Khadijah Valdez   12/7/2021  3:30 PM Zandra Gomez, PT [facility]   12/9/2021  3:30 PM Zandra Gomez, PT [facility]   12/14/2021  3:30 PM Zandra Gomez, PT [facility]   12/16/2021  3:30 PM Zandra Gomez, PT [facility]   12/30/2021  3:30 PM Zandra Gomez, PT [facility]

## 2021-12-07 ENCOUNTER — APPOINTMENT (OUTPATIENT)
Dept: PHYSICAL THERAPY | Age: 21
End: 2021-12-07
Payer: COMMERCIAL

## 2021-12-09 ENCOUNTER — HOSPITAL ENCOUNTER (OUTPATIENT)
Dept: PHYSICAL THERAPY | Age: 21
Discharge: HOME | End: 2021-12-09
Payer: COMMERCIAL

## 2021-12-09 PROCEDURE — 97530 THERAPEUTIC ACTIVITIES: CPT

## 2021-12-09 PROCEDURE — 97140 MANUAL THERAPY 1/> REGIONS: CPT

## 2021-12-09 PROCEDURE — 97110 THERAPEUTIC EXERCISES: CPT

## 2021-12-09 NOTE — PROGRESS NOTES
Arline   : 2000  Primary: Maria Antonia Espinoza Becca Overton  Secondary:  [address] [facility] @ [address] [place] [address].  [phone]   [phone]      OUTPATIENT PHYSICAL THERAPY: Daily Treatment Note  2021    ICD-10: Treatment Diagnosis: Pain in right lower leg (M79.661), Pain in left lower leg (M79.662), Stiffness of right ankle, not elsewhere classified (M25.671) and Stiffness of left ankle, not elsewhere classified (V39.939)    Effective Dates: 12/3/2021 TO 2022 (30 days). Frequency/Duration: 2 times a week for 30 Days    GOALS: (Goals have been discussed and agreed upon with patient.)  Short-Term Functional Goals: Time Frame: 1 week  1. Patient will display independence with initial HEP to facilitate progress of condition. Goal met. 2. Patient will note 10% improvement in capacity to walk uphill before onset of pain. In progress. Discharge Goals: Time Frame: 4 weeks  1. Patient will display normalized ROM of bilateral ankles to allow for more efficient stress pattern during ambulation. Goal partially met. 2. Patient will display independence with final HEP to continue to progress condition. In progress. 3. Patient will display capacity to perform 15 SL Heel raises bilaterally to indicate appropriate calf strength for community ambulation. Goal met. 4. Patient will demonstrate capacity to walk for two minutes on a Treadmill at max incline without pain to demonstrate improved community ambulatory capacity. In progress. _________________________________________________________________________  Pre-treatment Symptoms/Complaints:  Patient reports good tolerance to last treatment. Has generally been doing well but has been dealing with finals week leading to less sleep. States she has accordingly been feeling appreciably more tightness but reasonable fatigue.    Pain: Initial: 1/10 Post Session:  1/10   Medications Last Reviewed: 12/9/2021  Updated Objective Findings:  Below measures from 12/3/2021 unless otherwise indicated. LEFS: 67/80    Observation/Orthostatic Postural Assessment:    Patient gait cycle unremarkable. Palpation:    Diffusely increased tone bilaterally throughout gastroc-soleus complex. TTP at lateral head of left gastroc and bilateral posterior tib. muscle belly. ROM:    RLE:  CKC DF: 40 deg  PF: 55 deg  Inv.: 25 deg. Ev. 35 deg. LLE:  CKC DF: 40 deg  PF: 55 deg  Inv.: 25 deg. Ev. 35 deg. Strength:   RLE:  DF: 4+/5  PF: 4+/5 (20 SL HR*)  Inv.: 5/5  Ev. 5/5    LLE:  DF: 4+/5  PF: 4+/5 (20 SL HR*)  Inv.: 5/5  Ev. 5/5    Special Tests:    6MWT: 0.22 mile  Neurological Screen:  Sensation to light touch WNL Braulio. Functional Mobility:   Anterior Step Down Test: Max Reps in 30 seconds from 6 in. Step  R: 15 L: 16   Balance:    SL Airex Eyes Closed: Unshod  R: 5 sec. L: 3 sec. TREATMENT:   THERAPEUTIC ACTIVITY: ( see below for minutes): Therapeutic activities per grid below to improve mobility, strength, balance and coordination. Required minimal visual, verbal, manual and tactile cues to improve independence and safety with daily activities . THERAPEUTIC EXERCISE: (see below for minutes):  Exercises per grid below to improve mobility, strength, balance and coordination. Required minimal verbal and manual cues to promote proper body alignment, promote proper body posture and promote proper body mechanics. Progressed resistance, range, repetitions and complexity of movement as indicated. MANUAL THERAPY: (see below for minutes): Joint mobilization and Soft tissue mobilization was utilized and necessary because of the patient's restricted joint motion, painful spasm, loss of articular motion and restricted motion of soft tissue.    MODALITIES: (see below for minutes):      to decrease pain    SELF CARE: (see below for minutes): Procedure(s) (per grid) utilized to improve and/or restore self-care/home management as related to dressing, bathing and grooming. Required minimal verbal cueing to facilitate activities of daily living skills and compensatory activities. Date: 11/15/2021  Visit 8 11/17/2021  Visit 9 11/19/2021  Visit 10  11/29/2021  (Visit 11) 12/3/2021  (Visit 12) 12/9/2021  (Visit 13)   Modalities:                                    Therapeutic Exercise: 15 minutes 20 minutes 20 minutes 22 minutes 22 minutes 20 minutes   Slantboard Gastroc Stretch  3 x :30 3 x :30 3 x :30 3 x :30 3 x :30  3 x :30   Slantboard Soleus Stretch  3 x :30 3 x :30 3 x :30  3 x :30 3 x :30  3 x :30    Standing DF Leaning Against Wall  2 x 10 3 x 5  2 x 10  2 x 10  2 x 10    DL HR From Deficit 2 x 10  3 x 5   2 x 10  SL HR x20 ea. 2 x 10    Split Lunge DF with Front Foot Elevated 2 x 10  1 step  2 x 10   1 Step 2 x 10   2 Steps 20 x 10   @2 Steps     Split Stance PF 2 x 10      2 x 10    Anterior Step Downs  6 in. Step  2 x 10  3-Way 6 in.  2 x 5 ea. Ant/Retro  2 x 5 ea. B x20 ea. Proprioceptive Activities: 15 minutes 13 minutes 10 minutes 11 minutes 11 minutes    BAPS: CW/CCW x1' ea. x1' ea.  x1' ea x1' ea.  x1' ea. SL Airex 3-Way Slides 2 x 5 ea. 2 x 5 ea. SL A/P Taps on Rockerboard  2 x 10 ea. Static SL Balance On BOSU  3 x :20  Airex  3 x :30 ea. Airex  3 x :30 ea. Airex, unshod  3 x :30 ea. BOSU Lunges                           Manual Therapy:  12 minutes 15 minutes 12 minutes 12 minutes 12 minutes   GS IASTM with Movement  x8' x8' x8' x8' x8'   Posterior Tib. STM  x2' x4' x4'  x4'  x4'            Therapeutic Activities: 15 minutes     10 minutes   TM Incline Walking Inc: 15, Speed 3.0, x2'     TM Walking for 6MWT   TM Retro Walk/Push 2 x 1'        TM Sled Push with DF Focus 5 x 10 yds. @50#                   HEP: see 10/29/2021 flow sheet for specifics.     [facility] Portal  Treatment/Session Summary:  Patient displays fair tolerance to today's treatment session with no complaints of pain, just strain. Does well on 6MWT without provocation or pain. · Response to Treatment:  Patient is independent with performance of above described home program.  · Communication/Consultation:  None today  · Equipment provided today:  None today  · Recommendations/Intent for next treatment session: Wilfredo 112.       Total Treatment Billable Duration:  45 minutes  PT Patient Time In/Time Out  Time In: 1520  Time Out: [address], PT    Future Appointments   Date Time Provider Khadijah Valdez   12/14/2021  3:30 PM Rhiannon Salazar PT [facility]   12/16/2021  3:30 PM Rhiannon Salazar PT [facility]   12/30/2021  2:45 PM Abdoul Dasilva PT, DPT [facility]

## 2021-12-14 ENCOUNTER — HOSPITAL ENCOUNTER (OUTPATIENT)
Dept: PHYSICAL THERAPY | Age: 21
Discharge: HOME | End: 2021-12-14
Payer: COMMERCIAL

## 2021-12-14 PROCEDURE — 97112 NEUROMUSCULAR REEDUCATION: CPT

## 2021-12-14 PROCEDURE — 97110 THERAPEUTIC EXERCISES: CPT

## 2021-12-14 PROCEDURE — 97140 MANUAL THERAPY 1/> REGIONS: CPT

## 2021-12-14 NOTE — PROGRESS NOTES
Arline   : 2000  Primary: Daniel Hudson Phuongn  Secondary:  [address] @ [address]  [address] [place] [address].  [phone]   [phone]      OUTPATIENT PHYSICAL THERAPY: Daily Treatment Note  2021    ICD-10: Treatment Diagnosis: Pain in right lower leg (M79.661), Pain in left lower leg (M79.662), Stiffness of right ankle, not elsewhere classified (M25.671) and Stiffness of left ankle, not elsewhere classified (Z01.714)    Effective Dates: 12/3/2021 TO 2022 (30 days). Frequency/Duration: 2 times a week for 30 Days    GOALS: (Goals have been discussed and agreed upon with patient.)  Short-Term Functional Goals: Time Frame: 1 week  1. Patient will display independence with initial HEP to facilitate progress of condition. Goal met. 2. Patient will note 10% improvement in capacity to walk uphill before onset of pain. In progress. Discharge Goals: Time Frame: 4 weeks  1. Patient will display normalized ROM of bilateral ankles to allow for more efficient stress pattern during ambulation. Goal partially met. 2. Patient will display independence with final HEP to continue to progress condition. In progress. 3. Patient will display capacity to perform 15 SL Heel raises bilaterally to indicate appropriate calf strength for community ambulation. Goal met. 4. Patient will demonstrate capacity to walk for two minutes on a Treadmill at max incline without pain to demonstrate improved community ambulatory capacity. In progress. _________________________________________________________________________  Pre-treatment Symptoms/Complaints:  Patient reports doing fairly well since last visit. Still struggling with tightness but doing well to work through [employer]. Will be out of town for the rest of the week due to games and will be gone for winter break after that. States she is working on setting up an appointment to undergo workup for PVD.    Pain: Initial: 1/10 Post Session:  1/10   Medications Last Reviewed:  12/14/2021  Updated Objective Findings:  Below measures from 12/3/2021 unless otherwise indicated. LEFS: 67/80    Observation/Orthostatic Postural Assessment:    Patient gait cycle unremarkable. Palpation:    Diffusely increased tone bilaterally throughout gastroc-soleus complex. TTP at lateral head of left gastroc and bilateral posterior tib. muscle belly. ROM:    RLE:  CKC DF: 40 deg  PF: 55 deg  Inv.: 25 deg. Ev. 35 deg. LLE:  CKC DF: 40 deg  PF: 55 deg  Inv.: 25 deg. Ev. 35 deg. Strength:   RLE:  DF: 4+/5  PF: 4+/5 (20 SL HR*)  Inv.: 5/5  Ev. 5/5    LLE:  DF: 4+/5  PF: 4+/5 (20 SL HR*)  Inv.: 5/5  Ev. 5/5    Special Tests:    6MWT: 0.22 mile  Neurological Screen:  Sensation to light touch WNL Braulio. Functional Mobility:   Anterior Step Down Test: Max Reps in 30 seconds from 6 in. Step  R: 15 L: 16   Balance:    SL Airex Eyes Closed: Unshod  R: 5 sec. L: 3 sec. TREATMENT:   THERAPEUTIC ACTIVITY: ( see below for minutes): Therapeutic activities per grid below to improve mobility, strength, balance and coordination. Required minimal visual, verbal, manual and tactile cues to improve independence and safety with daily activities . THERAPEUTIC EXERCISE: (see below for minutes):  Exercises per grid below to improve mobility, strength, balance and coordination. Required minimal verbal and manual cues to promote proper body alignment, promote proper body posture and promote proper body mechanics. Progressed resistance, range, repetitions and complexity of movement as indicated. MANUAL THERAPY: (see below for minutes): Joint mobilization and Soft tissue mobilization was utilized and necessary because of the patient's restricted joint motion, painful spasm, loss of articular motion and restricted motion of soft tissue.    MODALITIES: (see below for minutes):      to decrease pain    SELF CARE: (see below for minutes): Procedure(s) (per grid) utilized to improve and/or restore self-care/home management as related to dressing, bathing and grooming. Required minimal verbal cueing to facilitate activities of daily living skills and compensatory activities. Date: 12/14/2021  (Visit 14)        Modalities:                                    Therapeutic Exercise: 20 minutes        Slantboard Gastroc Stretch  2 x :30         Slantboard Soleus Stretch  2 x :30         Standing DF Leaning Against Wall  2 x 10         DL HR From Deficit 2 x 10         Split Lunge DF with Front Foot Elevated 2 x 10         Split Stance PF         Anterior Step Downs 2 x 10                                                                                 Proprioceptive Activities: 15 minutes        BAPS: CW/CCW x1' ea. SL Airex 3-Way Slides         SL A/P, M/L Taps on Rockerboard x2' ea. Static SL Balance 3 x :30 Airex        BOSU Lunges                           Manual Therapy: 10 minutes        GS IASTM With/without movement        Posterior Tib. STM With/without movement                 Therapeutic Activities:         TM Incline Walking         TM Retro Walk/Push         TM Sled Push with DF Focus                    HEP: see 10/29/2021 flow sheet for specifics. [employer] Portal  Treatment/Session Summary:  Patient displays good tolerance to today's treatment session with mild complaints of transient strain related discomfort. Continues to tolerate progressive overload well but ultimately remains limited. Will continue to benefit from skilled care accordingly. · Response to Treatment:  Patient is independent with performance of above described home program.  · Communication/Consultation:  None today  · Equipment provided today:  None today  · Recommendations/Intent for next treatment session: Assess tolerance to break and need for continuance of PT.  F/U regarding MDV    Total Treatment Billable Duration:  45 minutes  PT Patient Time In/Time Out  Time In: 1530  Time Out: [address], PT    Future Appointments   Date Time Provider Khadijah Courtney   12/16/2021  3:30 PM Bogdan Dickey PT [facility]   12/30/2021  2:45 PM Gretchen Beltran, PT, DPT [facility]   1/4/2022  3:30 PM Gretchen Beltran PT, DPT [facility]   1/6/2022  3:30 PM Gretchen Beltran PT, DPT [facility]   1/11/2022  3:30 PM Gretchen Beltran PT, DPT [facility]   1/13/2022  3:30 PM Gretchen Beltran PT, DPT [facility]   1/18/2022  3:30 PM Gretchen Beltran PT, DPT [facility]   1/20/2022  3:30 PM Gretchen Beltran PT, DPT [facility]   1/25/2022  3:30 PM Gretchen Beltran PT, DPT [facility]

## 2021-12-21 ENCOUNTER — APPOINTMENT (OUTPATIENT)
Dept: PHYSICAL THERAPY | Age: 21
End: 2021-12-21
Payer: COMMERCIAL

## 2021-12-28 ENCOUNTER — APPOINTMENT (OUTPATIENT)
Dept: PHYSICAL THERAPY | Age: 21
End: 2021-12-28
Payer: COMMERCIAL

## 2021-12-30 ENCOUNTER — APPOINTMENT (OUTPATIENT)
Dept: PHYSICAL THERAPY | Age: 21
End: 2021-12-30
Payer: COMMERCIAL

## 2022-01-04 ENCOUNTER — APPOINTMENT (OUTPATIENT)
Dept: PHYSICAL THERAPY | Age: 22
End: 2022-01-04
Payer: COMMERCIAL

## 2022-01-06 ENCOUNTER — HOSPITAL ENCOUNTER (OUTPATIENT)
Dept: PHYSICAL THERAPY | Age: 22
Discharge: HOME | End: 2022-01-06
Payer: COMMERCIAL

## 2022-01-06 PROCEDURE — 97140 MANUAL THERAPY 1/> REGIONS: CPT

## 2022-01-06 PROCEDURE — 97110 THERAPEUTIC EXERCISES: CPT

## 2022-01-06 NOTE — PROGRESS NOTES
Arline   : 2000  Primary: Arnel Hudson Rpn  Secondary:  Celina Mo @ [address] [address].  [phone]   [phone]      OUTPATIENT PHYSICAL THERAPY: Daily Treatment Note  2022    ICD-10: Treatment Diagnosis: Pain in right lower leg (M79.661), Pain in left lower leg (M79.662), Stiffness of right ankle, not elsewhere classified (M25.671) and Stiffness of left ankle, not elsewhere classified (L84.481)    Effective Dates: 12/3/2021 TO 2022 (30 days). Frequency/Duration: 2 times a week for 30 Days    GOALS: (Goals have been discussed and agreed upon with patient.)  Short-Term Functional Goals: Time Frame: 1 week  Patient will display independence with initial HEP to facilitate progress of condition. Goal met. Patient will note 10% improvement in capacity to walk uphill before onset of pain. In progress. Discharge Goals: Time Frame: 4 weeks  Patient will display normalized ROM of bilateral ankles to allow for more efficient stress pattern during ambulation. Goal partially met. Patient will display independence with final HEP to continue to progress condition. In progress. Patient will display capacity to perform 15 SL Heel raises bilaterally to indicate appropriate calf strength for community ambulation. Goal met. Patient will demonstrate capacity to walk for two minutes on a Treadmill at max incline without pain to demonstrate improved community ambulatory capacity. In progress. _________________________________________________________________________  Pre-treatment Symptoms/Complaints:  \"I was sick over break and I didn't have a car to go to the doctor when I was home. \"   Pain: Initial: 1/10 Post Session:  1/10   Medications Last Reviewed:  2022  Updated Objective Findings:  Below measures from 12/3/2021 unless otherwise indicated.    LEFS: 67/80    Observation/Orthostatic Postural Assessment:    Patient gait cycle unremarkable. Palpation:    Diffusely increased tone bilaterally throughout gastroc-soleus complex. TTP at lateral head of left gastroc and bilateral posterior tib. muscle belly. ROM:    RLE:  CKC DF: 40 deg  PF: 55 deg  Inv.: 25 deg. Ev. 35 deg. LLE:  CKC DF: 40 deg  PF: 55 deg  Inv.: 25 deg. Ev. 35 deg. Strength:   RLE:  DF: 4+/5  PF: 4+/5 (20 SL HR*)  Inv.: 5/5  Ev. 5/5    LLE:  DF: 4+/5  PF: 4+/5 (20 SL HR*)  Inv.: 5/5  Ev. 5/5    Special Tests:    6MWT: 0.22 mile  Neurological Screen:  Sensation to light touch WNL Brauilo. Functional Mobility:   Anterior Step Down Test: Max Reps in 30 seconds from 6 in. Step  R: 15 L: 16   Balance:    SL Airex Eyes Closed: Unshod  R: 5 sec. L: 3 sec. TREATMENT:   THERAPEUTIC ACTIVITY: ( see below for minutes): Therapeutic activities per grid below to improve mobility, strength, balance and coordination. Required minimal visual, verbal, manual and tactile cues to improve independence and safety with daily activities . THERAPEUTIC EXERCISE: (see below for minutes):  Exercises per grid below to improve mobility, strength, balance and coordination. Required minimal verbal and manual cues to promote proper body alignment, promote proper body posture and promote proper body mechanics. Progressed resistance, range, repetitions and complexity of movement as indicated. MANUAL THERAPY: (see below for minutes): Joint mobilization and Soft tissue mobilization was utilized and necessary because of the patient's restricted joint motion, painful spasm, loss of articular motion and restricted motion of soft tissue. MODALITIES: (see below for minutes):      to decrease pain    SELF CARE: (see below for minutes): Procedure(s) (per grid) utilized to improve and/or restore self-care/home management as related to dressing, bathing and grooming. Required minimal verbal cueing to facilitate activities of daily living skills and compensatory activities.      Date: 12/14/2021  (Visit 14) 1-6-22  (Visit 15)        Modalities:                                    Therapeutic Exercise: 20 minutes 30 mins        Slantboard Gastroc Stretch  2 x :30  Repeat        Slantboard Soleus Stretch  2 x :30  Repeat        Standing DF Leaning Against Wall  2 x 10  Repeat        DL HR From Deficit 2 x 10         Split Lunge DF with Front Foot Elevated 2 x 10  Repeat        Split Stance PF         Anterior Step Downs 2 x 10  2x10 bilateral 6in        Bike warm up   5 mins        bosu ball   2x10 bilateral step ups        Wobble board   Front/back  Lateral   Circles x 10 each        marcela disc   SLS x30SH        Lateral eccentric heel taps   2x10 bilateral 6in step                                   Proprioceptive Activities: 15 minutes        BAPS: CW/CCW x1' ea. SL Airex 3-Way Slides         SL A/P, M/L Taps on Rockerboard x2' ea. Static SL Balance 3 x :30 Airex Repeat        BOSU Lunges                           Manual Therapy: 10 minutes 15 mins        GS IASTM With/without movement        Posterior Tib. STM With/without movement Manual stretching                 Therapeutic Activities:         TM Incline Walking         TM Retro Walk/Push         TM Sled Push with DF Focus                    HEP: see 10/29/2021 flow sheet for specifics. [facility]  Treatment/Session Summary:  Pt continues to have difficulty with walking hills and stated the numb tingling feeling comes on at times. Pt has not been to the doctor regarding PVD yet but is planning on it after basketball season. Continue with POC. Response to Treatment:  Patient is independent with performance of above described home program.  Communication/Consultation:  None today  Equipment provided today:  None today  Recommendations/Intent for next treatment session: Assess tolerance to break and need for continuance of PT.  F/U regarding MDV    Total Treatment Billable Duration:  45 minutes  PT Patient Time In/Time Out  Time In: 0330  Time Out: Myron Landeros PT, DPT    Future Appointments   Date Time Provider Khadijah Valdez   1/11/2022  3:30 PM Horacio Giron, PT, DPT [facility]   1/13/2022  3:30 PM Horacio Do, PT, DPT [facility]   1/18/2022  3:30 PM Horacio Giron, PT, DPT [facility]   1/20/2022  3:30 PM Horacio Giron, PT, DPT [facility]   1/25/2022  3:30 PM Horacio Giron, PT, DPT [facility]

## 2022-01-11 ENCOUNTER — APPOINTMENT (OUTPATIENT)
Dept: PHYSICAL THERAPY | Age: 22
End: 2022-01-11
Payer: COMMERCIAL

## 2022-01-13 ENCOUNTER — HOSPITAL ENCOUNTER (OUTPATIENT)
Dept: PHYSICAL THERAPY | Age: 22
Discharge: HOME | End: 2022-01-13
Payer: COMMERCIAL

## 2022-01-13 PROCEDURE — 97110 THERAPEUTIC EXERCISES: CPT

## 2022-01-13 NOTE — PROGRESS NOTES
Arline Turner  : 2000  Primary: Eddie Hudson Rpn  Secondary:  Valente Tinajero @ [address] [place] [address].  [phone]   [phone]      OUTPATIENT PHYSICAL THERAPY: Daily Treatment Note and Recertification       ICD-10: Treatment Diagnosis: Pain in right lower leg (M79.661), Pain in left lower leg (M79.662), Stiffness of right ankle, not elsewhere classified (M25.671) and Stiffness of left ankle, not elsewhere classified (E28.490)    Effective Dates: 22 to 2022 (30 days). Frequency/Duration: 2 times a week for 30 Days    GOALS: (Goals have been discussed and agreed upon with patient.)  Short-Term Functional Goals: Time Frame: 1 week  Patient will display independence with initial HEP to facilitate progress of condition. MET. Patient will note 10% improvement in capacity to walk uphill before onset of pain. MET. Discharge Goals: Time Frame: 4 weeks  Patient will display normalized ROM of bilateral ankles to allow for more efficient stress pattern during ambulation. MET. Patient will display independence with final HEP to continue to progress condition. MET. Patient will display capacity to perform 15 SL Heel raises bilaterally to indicate appropriate calf strength for community ambulation. MET. Patient will demonstrate capacity to walk for two minutes on a Treadmill at max incline without pain to demonstrate improved community ambulatory capacity. MET/   Pt able to perform all recreational activities and daily activities painfree in the gastrocs. (Progressing)     Regarding Arline Turner's therapy, I certify that the treatment plan above will be carried out by a therapist or under their direction.   Thank you for this referral,  Kirill Muñoz, PT, DPT     Referring Physician Signature: Ping Rosales, * _______________________________ Date _____________      _________________________________________________________________________  Pre-treatment Symptoms/Complaints:  \"I walked the hill and it felt better this time. I only had minimal tightness. \"   Pain: Initial: 1/10 Post Session:  1/10   Medications Last Reviewed:  1/13/2022  Updated Objective Findings:  Below measures from 12/3/2021 unless otherwise indicated. LEFS: 67/80    1-13-22 (76/80)     Observation/Orthostatic Postural Assessment:    Patient gait cycle unremarkable. Palpation:    Diffusely increased tone bilaterally throughout gastroc-soleus complex. TTP at lateral head of left gastroc and bilateral posterior tib. muscle belly. ROM:    RLE:  CKC DF: 40 deg  PF: 55 deg  Inv.: 25 deg. Ev. 35 deg. LLE:  CKC DF: 40 deg  PF: 55 deg  Inv.: 25 deg. Ev. 35 deg. Strength:   RLE:  DF: 4+/5  PF: 4+/5 (20 SL HR*)  Inv.: 5/5  Ev. 5/5    LLE:  DF: 4+/5  PF: 4+/5 (20 SL HR*)  Inv.: 5/5  Ev. 5/5    Special Tests:    6MWT: 0.22 mile  Neurological Screen:  Sensation to light touch WNL Braulio. Functional Mobility:   Anterior Step Down Test: Max Reps in 30 seconds from 6 in. Step  R: 15 L: 16   Balance:    SL Airex Eyes Closed: Unshod  R: 5 sec. L: 3 sec. TREATMENT:   THERAPEUTIC ACTIVITY: ( see below for minutes): Therapeutic activities per grid below to improve mobility, strength, balance and coordination. Required minimal visual, verbal, manual and tactile cues to improve independence and safety with daily activities . THERAPEUTIC EXERCISE: (see below for minutes):  Exercises per grid below to improve mobility, strength, balance and coordination. Required minimal verbal and manual cues to promote proper body alignment, promote proper body posture and promote proper body mechanics. Progressed resistance, range, repetitions and complexity of movement as indicated.   MANUAL THERAPY: (see below for minutes): Joint mobilization and Soft tissue mobilization was utilized and necessary because of the patient's restricted joint motion, painful spasm, loss of articular motion and restricted motion of soft tissue. MODALITIES: (see below for minutes):      to decrease pain    SELF CARE: (see below for minutes): Procedure(s) (per grid) utilized to improve and/or restore self-care/home management as related to dressing, bathing and grooming. Required minimal verbal cueing to facilitate activities of daily living skills and compensatory activities. Date: 12/14/2021  (Visit 14) 1-6-22  (Visit 15)  1-13-22  (Visit 16)  Recertification      Modalities:                                    Therapeutic Exercise: 20 minutes 30 mins  45 mins       Slantboard Gastroc Stretch  2 x :30  Repeat  slantboard       Slantboard Soleus Stretch  2 x :30  Repeat  slantboard       Standing DF Leaning Against Wall  2 x 10  Repeat        DL HR From Deficit 2 x 10         Split Lunge DF with Front Foot Elevated 2 x 10  Repeat        Split Stance PF         Anterior Step Downs 2 x 10  2x10 bilateral 6in  On slantboard 2x10 bilateral       Bike warm up   5 mins  Treadmill x 5 mins with 15 incline (max)       bosu ball   2x10 bilateral step ups        Wobble board   Front/back  Lateral   Circles x 10 each  Repeat       marcela disc   SLS x30SH        Lateral eccentric heel taps   2x10 bilateral 6in step        Seated heel raise    Black kettlebell x40 bilateral       Heel raise on leg press    140# x10       Reverse lunge    With slider 2x10 bilateral       Lateral leap and hold    x10       Exaggerated walking    Forward and back 2L       Proprioceptive Activities: 15 minutes        BAPS: CW/CCW x1' ea. SL Airex 3-Way Slides         SL A/P, M/L Taps on Rockerboard x2' ea. Static SL Balance 3 x :30 Airex Repeat        BOSU Lunges                           Manual Therapy: 10 minutes 15 mins        GS IASTM With/without movement        Posterior Tib.  STM With/without movement Manual stretching                 Therapeutic Activities:         TM Incline Walking         TM Retro Walk/Push         TM Sled Push with DF Focus                    HEP: see 10/29/2021 flow sheet for specifics. [employer] Portal  Treatment/Session Summary:  Pt to continue until the end of the month and then discharge to [facility]. Pt is showing progress but shows weakness in the gastroc soleus complex. Continue with pOC. Response to Treatment:  Patient is independent with performance of above described home program.  Communication/Consultation:  None today  Equipment provided today:  None today  Recommendations/Intent for next treatment session: Assess tolerance to break and need for continuance of PT.  F/U regarding MDV    Total Treatment Billable Duration:  45 minutes  PT Patient Time In/Time Out  Time In: 0330  Time Out: Myron Landeros, PT, DPT    Future Appointments   Date Time Provider Khadijah Valdez   1/13/2022  3:30 PM Blanche Rascon, PT, DPT [facility]   1/18/2022  3:30 PM Blanche Rascon, PT, DPT [facility]   1/20/2022  3:30 PM Blanche Rascon, PT, DPT [facility]   1/25/2022  3:30 PM Blanche Rascon, PT, DPT [facility]   2/2/2022  3:30 PM Blanche Rascon, PT, DPT [facility]   2/7/2022  3:30 PM Blanche Rascon, PT, DPT [facility]   2/9/2022  3:30 PM Blanche Rascon, PT, DPT [facility]   2/14/2022  3:30 PM Blanche Rascon, PT, DPT [facility]   2/16/2022  3:30 PM Blanche Rascon, PT, DPT [facility]   2/21/2022  3:30 PM Blanche Rascon, PT, DPT [facility]   2/23/2022  3:30 PM Blanche Rascon, PT, DPT [facility]   2/28/2022  3:30 PM Blanche Rascon, PT, DPT [facility]

## 2022-01-18 ENCOUNTER — HOSPITAL ENCOUNTER (OUTPATIENT)
Dept: PHYSICAL THERAPY | Age: 22
Discharge: HOME | End: 2022-01-18
Payer: COMMERCIAL

## 2022-01-25 ENCOUNTER — HOSPITAL ENCOUNTER (OUTPATIENT)
Dept: PHYSICAL THERAPY | Age: 22
Discharge: HOME | End: 2022-01-25
Payer: COMMERCIAL

## 2022-01-25 PROCEDURE — 97110 THERAPEUTIC EXERCISES: CPT

## 2022-01-25 NOTE — PROGRESS NOTES
Arline   : 2000  Primary: Rishabh Hudson Tian  Secondary:  Kathrin Pickett @ [address]  [phone]   [phone]      OUTPATIENT PHYSICAL THERAPY: Daily Treatment Note    2022    ICD-10: Treatment Diagnosis: Pain in right lower leg (M79.661), Pain in left lower leg (M79.662), Stiffness of right ankle, not elsewhere classified (M25.671) and Stiffness of left ankle, not elsewhere classified (H55.020)    Effective Dates: 22 to 2022 (30 days). Frequency/Duration: 2 times a week for 30 Days    GOALS: (Goals have been discussed and agreed upon with patient.)  Short-Term Functional Goals: Time Frame: 1 week  Patient will display independence with initial HEP to facilitate progress of condition. MET. Patient will note 10% improvement in capacity to walk uphill before onset of pain. MET. Discharge Goals: Time Frame: 4 weeks  Patient will display normalized ROM of bilateral ankles to allow for more efficient stress pattern during ambulation. MET. Patient will display independence with final HEP to continue to progress condition. MET. Patient will display capacity to perform 15 SL Heel raises bilaterally to indicate appropriate calf strength for community ambulation. MET. Patient will demonstrate capacity to walk for two minutes on a Treadmill at max incline without pain to demonstrate improved community ambulatory capacity. MET/   Pt able to perform all recreational activities and daily activities painfree in the gastrocs. (Progressing)       _________________________________________________________________________  Pre-treatment Symptoms/Complaints: \"Im feeling much better with the ankle and the lower legs but the knees are still bothering me and my back hurts today. \"   Pain: Initial: 1/10 Post Session:  1/10   Medications Last Reviewed:  2022  Updated Objective Findings:  Below measures from 12/3/2021 unless otherwise indicated. LEFS: 67/80    1-13-22 (76/80)     Observation/Orthostatic Postural Assessment:    Patient gait cycle unremarkable. Palpation:    Diffusely increased tone bilaterally throughout gastroc-soleus complex. TTP at lateral head of left gastroc and bilateral posterior tib. muscle belly. ROM:    RLE:  CKC DF: 40 deg  PF: 55 deg  Inv.: 25 deg. Ev. 35 deg. LLE:  CKC DF: 40 deg  PF: 55 deg  Inv.: 25 deg. Ev. 35 deg. Strength:   RLE:  DF: 4+/5  PF: 4+/5 (20 SL HR*)  Inv.: 5/5  Ev. 5/5    LLE:  DF: 4+/5  PF: 4+/5 (20 SL HR*)  Inv.: 5/5  Ev. 5/5    Special Tests:    6MWT: 0.22 mile  Neurological Screen:  Sensation to light touch WNL Braulio. Functional Mobility:   Anterior Step Down Test: Max Reps in 30 seconds from 6 in. Step  R: 15 L: 16   Balance:    SL Airex Eyes Closed: Unshod  R: 5 sec. L: 3 sec. TREATMENT:   THERAPEUTIC ACTIVITY: ( see below for minutes): Therapeutic activities per grid below to improve mobility, strength, balance and coordination. Required minimal visual, verbal, manual and tactile cues to improve independence and safety with daily activities . THERAPEUTIC EXERCISE: (see below for minutes):  Exercises per grid below to improve mobility, strength, balance and coordination. Required minimal verbal and manual cues to promote proper body alignment, promote proper body posture and promote proper body mechanics. Progressed resistance, range, repetitions and complexity of movement as indicated. MANUAL THERAPY: (see below for minutes): Joint mobilization and Soft tissue mobilization was utilized and necessary because of the patient's restricted joint motion, painful spasm, loss of articular motion and restricted motion of soft tissue. MODALITIES: (see below for minutes):      to decrease pain    SELF CARE: (see below for minutes): Procedure(s) (per grid) utilized to improve and/or restore self-care/home management as related to dressing, bathing and grooming. Required minimal verbal cueing to facilitate activities of daily living skills and compensatory activities. Date: 12/14/2021  (Visit 14) 1-6-22  (Visit 15)  1-13-22  (Visit 16)  Recertification 2-53-49  (Visit 17)      Modalities:                                    Therapeutic Exercise: 20 minutes 30 mins  45 mins  30 mins      Slantboard Gastroc Stretch  2 x :30  Repeat  slantboard  2x30SH      Slantboard Soleus Stretch  2 x :30  Repeat  slantboard  2x30SH      Standing DF Leaning Against Wall  2 x 10  Repeat        DL HR From Deficit 2 x 10         Split Lunge DF with Front Foot Elevated 2 x 10  Repeat        Split Stance PF         Anterior Step Downs 2 x 10  2x10 bilateral 6in  On slantboard 2x10 bilateral  Repeat      Bike warm up   5 mins  Treadmill x 5 mins with 15 incline (max)       bosu ball   2x10 bilateral step ups        Wobble board   Front/back  Lateral   Circles x 10 each  Repeat  Repeat x 15 reps each      marcela disc   SLS x30SH        Lateral eccentric heel taps   2x10 bilateral 6in step        Seated heel raise    Black kettlebell x40 bilateral  Standing quick heel raises x 30      Heel raise on leg press    140# x10       Reverse lunge    With slider 2x10 bilateral  2x10 with slider bilateral      Lateral leap and hold    x10       Exaggerated walking    Forward and back 2L  Repeat back only      Proprioceptive Activities: 15 minutes        BAPS: CW/CCW x1' ea. SL Airex 3-Way Slides         SL A/P, M/L Taps on Rockerboard x2' ea. Static SL Balance 3 x :30 Airex Repeat        BOSU Lunges                           Manual Therapy: 10 minutes 15 mins        GS IASTM With/without movement        Posterior Tib. STM With/without movement Manual stretching                 Therapeutic Activities:         TM Incline Walking         TM Retro Walk/Push         TM Sled Push with DF Focus                    HEP: see 10/29/2021 flow sheet for specifics.     [employer] Portal  Treatment/Session Summary: Pt to continue with therapy to work on knee and ankle stability and strengthening. Communication/Consultation:  None today  Equipment provided today:  None today  Recommendations/Intent for next treatment session: Assess tolerance to break and need for continuance of PT.  F/U regarding MDV    Total Treatment Billable Duration:  45 minutes  PT Patient Time In/Time Out  Time In: 0330  Time Out: Myron Landeros, PT, DPT    Future Appointments   Date Time Provider Khadijah Valdez   2/1/2022  2:00 PM Wellington Laurens, PT, DPT [facility]   2/3/2022  7:00 PM Wellington Roses, PT, DPT [facility]   2/7/2022 11:45 AM Wellington Laurens, PT, DPT [facility]   2/10/2022  2:45 PM Wellington Xins, PT, DPT [facility]   2/14/2022 11:45 AM Wellington Laurens, PT, DPT [facility]   2/17/2022  2:45 PM Wellington Roses, PT, DPT [facility]   2/23/2022 11:45 AM Wellington Roses, PT, DPT [facility]   2/25/2022 11:45 AM Wellington Roses, PT, DPT [facility]

## 2022-02-01 ENCOUNTER — HOSPITAL ENCOUNTER (OUTPATIENT)
Dept: PHYSICAL THERAPY | Age: 22
Discharge: HOME | End: 2022-02-01
Payer: COMMERCIAL

## 2022-02-01 PROCEDURE — 97014 ELECTRIC STIMULATION THERAPY: CPT

## 2022-02-01 PROCEDURE — 97110 THERAPEUTIC EXERCISES: CPT

## 2022-02-01 NOTE — PROGRESS NOTES
Arline   : 2000  Primary: Raman Hudson Phuongn  Secondary:  [address] [facility] @ [address] [place] [address]  [phone]   [phone]      OUTPATIENT PHYSICAL THERAPY: Daily Treatment Note    2022    ICD-10: Treatment Diagnosis: Pain in right lower leg (M79.661), Pain in left lower leg (M79.662), Stiffness of right ankle, not elsewhere classified (M25.671) and Stiffness of left ankle, not elsewhere classified (Q70.512)    Effective Dates: 22 to 2022 (30 days). Frequency/Duration: 2 times a week for 30 Days    GOALS: (Goals have been discussed and agreed upon with patient.)  Short-Term Functional Goals: Time Frame: 1 week  Patient will display independence with initial HEP to facilitate progress of condition. MET. Patient will note 10% improvement in capacity to walk uphill before onset of pain. MET. Discharge Goals: Time Frame: 4 weeks  Patient will display normalized ROM of bilateral ankles to allow for more efficient stress pattern during ambulation. MET. Patient will display independence with final HEP to continue to progress condition. MET. Patient will display capacity to perform 15 SL Heel raises bilaterally to indicate appropriate calf strength for community ambulation. MET. Patient will demonstrate capacity to walk for two minutes on a Treadmill at max incline without pain to demonstrate improved community ambulatory capacity. MET/   Pt able to perform all recreational activities and daily activities painfree in the gastrocs. (Progressing)       _________________________________________________________________________  Pre-treatment Symptoms/Complaints: \"My legs feel fine. My back hurts today from carrying my bookbag and just general stuff. \"   Pain: Initial: 1/10 Post Session:  1/10   Medications Last Reviewed:  2022  Updated Objective Findings:  Below measures from 12/3/2021 unless otherwise indicated.    LEFS: 67/80    1-13-22 (76/80)     Observation/Orthostatic Postural Assessment:    Patient gait cycle unremarkable. Palpation:    Diffusely increased tone bilaterally throughout gastroc-soleus complex. TTP at lateral head of left gastroc and bilateral posterior tib. muscle belly. ROM:    RLE:  CKC DF: 40 deg  PF: 55 deg  Inv.: 25 deg. Ev. 35 deg. LLE:  CKC DF: 40 deg  PF: 55 deg  Inv.: 25 deg. Ev. 35 deg. Strength:   RLE:  DF: 4+/5  PF: 4+/5 (20 SL HR*)  Inv.: 5/5  Ev. 5/5    LLE:  DF: 4+/5  PF: 4+/5 (20 SL HR*)  Inv.: 5/5  Ev. 5/5    Special Tests:    6MWT: 0.22 mile  Neurological Screen:  Sensation to light touch WNL Braulio. Functional Mobility:   Anterior Step Down Test: Max Reps in 30 seconds from 6 in. Step  R: 15 L: 16   Balance:    SL Airex Eyes Closed: Unshod  R: 5 sec. L: 3 sec. TREATMENT:   THERAPEUTIC ACTIVITY: ( see below for minutes): Therapeutic activities per grid below to improve mobility, strength, balance and coordination. Required minimal visual, verbal, manual and tactile cues to improve independence and safety with daily activities . THERAPEUTIC EXERCISE: (see below for minutes):  Exercises per grid below to improve mobility, strength, balance and coordination. Required minimal verbal and manual cues to promote proper body alignment, promote proper body posture and promote proper body mechanics. Progressed resistance, range, repetitions and complexity of movement as indicated. MANUAL THERAPY: (see below for minutes): Joint mobilization and Soft tissue mobilization was utilized and necessary because of the patient's restricted joint motion, painful spasm, loss of articular motion and restricted motion of soft tissue. MODALITIES: (see below for minutes):      to decrease pain    SELF CARE: (see below for minutes): Procedure(s) (per grid) utilized to improve and/or restore self-care/home management as related to dressing, bathing and grooming.  Required minimal verbal cueing to facilitate activities of daily living skills and compensatory activities. Date: 12/14/2021  (Visit 14) 1-6-22  (Visit 15)  1-13-22  (Visit 16)  Recertification [id number]  (Visit 17)  2-1-22  (Visit 18)     Modalities:     15 mins     IFC with HP to mid thoracic      15 mins                       Therapeutic Exercise: 20 minutes 30 mins  45 mins  30 mins  30 mins     Slantboard Gastroc Stretch  2 x :30  Repeat  slantboard  2x30SH      Slantboard Soleus Stretch  2 x :30  Repeat  slantboard  2x30SH      Standing DF Leaning Against Wall  2 x 10  Repeat        DL HR From Deficit 2 x 10         Split Lunge DF with Front Foot Elevated 2 x 10  Repeat        Split Stance PF         Anterior Step Downs 2 x 10  2x10 bilateral 6in  On slantboard 2x10 bilateral  Repeat      Bike warm up   5 mins  Treadmill x 5 mins with 15 incline (max)   Bike warm up     bosu ball   2x10 bilateral step ups        Wobble board   Front/back  Lateral   Circles x 10 each  Repeat  Repeat x 15 reps each      marcela disc   SLS x30SH        Lateral eccentric heel taps   2x10 bilateral 6in step        Seated heel raise    Black kettlebell x40 bilateral  Standing quick heel raises x 30      Heel raise on leg press    140# x10       Reverse lunge    With slider 2x10 bilateral  2x10 with slider bilateral      HS curls with ball      2x10     LB rotations      x10     Thoracic rotations with end range stretch SL      x10 bilateral     Prone thoracic lift with chin tuck      x10     Abdominal bracing with marching in supine      x10 bilateral     Pelvic tilt      x10     Standing thoracic rotation      x10              Bridging with resistance      Grey band 3x10     Lateral leap and hold    x10       Exaggerated walking    Forward and back 2L  Repeat back only      Proprioceptive Activities: 15 minutes        BAPS: CW/CCW x1' ea. SL Airex 3-Way Slides         SL A/P, M/L Taps on Rockerboard x2' ea.         Static SL Balance 3 x :30 Airex Repeat MAE Burr                           Manual Therapy: 10 minutes 15 mins        GS IASTM With/without movement        Posterior Tib. STM With/without movement Manual stretching                 Therapeutic Activities:         TM Incline Walking         TM Retro Walk/Push         TM Sled Push with DF Focus                    HEP: see 10/29/2021 flow sheet for specifics. [employer] Portal  Treatment/Session Summary: pt to continue with working on strengthening and core stability as well as lower extremity stretching and strengthening. Communication/Consultation:  None today  Equipment provided today:  None today  Recommendations/Intent for next treatment session: Assess tolerance to break and need for continuance of PT.  F/U regarding MDV    Total Treatment Billable Duration:  45 minutes  PT Patient Time In/Time Out  Time In: 0200  Time Out: 3104 Asya Lezama, PT, DPT    Future Appointments   Date Time Provider Khadijah Valdez   2/3/2022  7:00 PM Marija Pang, PT, DPT [facility]   2/7/2022 11:45 AM Marija Pang PT, DPT [facility]   2/10/2022  2:45 PM Marija Pang PT, DPT [facility]   2/14/2022 11:45 AM Marija Pang PT, DPT [facility]   2/17/2022  2:45 PM Marija Pang PT, DPT [facility]   2/23/2022 11:45 AM Marija Pang PT, DPT [facility]   2/25/2022 11:45 AM Marija Pang PT, DPT [facility]

## 2022-02-02 ENCOUNTER — APPOINTMENT (OUTPATIENT)
Dept: PHYSICAL THERAPY | Age: 22
End: 2022-02-02
Payer: COMMERCIAL

## 2022-02-07 ENCOUNTER — APPOINTMENT (OUTPATIENT)
Dept: PHYSICAL THERAPY | Age: 22
End: 2022-02-07
Payer: COMMERCIAL

## 2022-02-07 ENCOUNTER — HOSPITAL ENCOUNTER (OUTPATIENT)
Dept: PHYSICAL THERAPY | Age: 22
Discharge: HOME | End: 2022-02-07
Payer: COMMERCIAL

## 2022-02-07 PROCEDURE — 97110 THERAPEUTIC EXERCISES: CPT

## 2022-02-07 NOTE — PROGRESS NOTES
Arline   : 2000  Primary: Daphney Hudson Rpn  Secondary:  [address] @ [address], [facility] BHANU S[place].  [phone]   [phone]      OUTPATIENT PHYSICAL THERAPY: Daily Treatment Note    2022    ICD-10: Treatment Diagnosis: Pain in right lower leg (M79.661), Pain in left lower leg (M79.662), Stiffness of right ankle, not elsewhere classified (M25.671) and Stiffness of left ankle, not elsewhere classified (Y47.769)    Effective Dates: 22 to 2022 (30 days). Frequency/Duration: 2 times a week for 30 Days    GOALS: (Goals have been discussed and agreed upon with patient.)  Short-Term Functional Goals: Time Frame: 1 week  Patient will display independence with initial HEP to facilitate progress of condition. MET. Patient will note 10% improvement in capacity to walk uphill before onset of pain. MET. Discharge Goals: Time Frame: 4 weeks  Patient will display normalized ROM of bilateral ankles to allow for more efficient stress pattern during ambulation. MET. Patient will display independence with final HEP to continue to progress condition. MET. Patient will display capacity to perform 15 SL Heel raises bilaterally to indicate appropriate calf strength for community ambulation. MET. Patient will demonstrate capacity to walk for two minutes on a Treadmill at max incline without pain to demonstrate improved community ambulatory capacity. MET/   Pt able to perform all recreational activities and daily activities painfree in the gastrocs. (Progressing)       _________________________________________________________________________  Pre-treatment Symptoms/Complaints: \"Im doing better and my legs havent felt weird in a while so that's good. \"   Pain: Initial: 1/10 Post Session:  1/10   Medications Last Reviewed:  2022  Updated Objective Findings:  Below measures from 12/3/2021 unless otherwise indicated.    LEFS: 67/80    22 (76/80)     Observation/Orthostatic Postural Assessment:    Patient gait cycle unremarkable. Palpation:    Diffusely increased tone bilaterally throughout gastroc-soleus complex. TTP at lateral head of left gastroc and bilateral posterior tib. muscle belly. ROM:    RLE:  CKC DF: 40 deg  PF: 55 deg  Inv.: 25 deg. Ev. 35 deg. LLE:  CKC DF: 40 deg  PF: 55 deg  Inv.: 25 deg. Ev. 35 deg. Strength:   RLE:  DF: 4+/5  PF: 4+/5 (20 SL HR*)  Inv.: 5/5  Ev. 5/5    LLE:  DF: 4+/5  PF: 4+/5 (20 SL HR*)  Inv.: 5/5  Ev. 5/5    Special Tests:    6MWT: 0.22 mile  Neurological Screen:  Sensation to light touch WNL Braulio. Functional Mobility:   Anterior Step Down Test: Max Reps in 30 seconds from 6 in. Step  R: 15 L: 16   Balance:    SL Airex Eyes Closed: Unshod  R: 5 sec. L: 3 sec. TREATMENT:   THERAPEUTIC ACTIVITY: ( see below for minutes): Therapeutic activities per grid below to improve mobility, strength, balance and coordination. Required minimal visual, verbal, manual and tactile cues to improve independence and safety with daily activities . THERAPEUTIC EXERCISE: (see below for minutes):  Exercises per grid below to improve mobility, strength, balance and coordination. Required minimal verbal and manual cues to promote proper body alignment, promote proper body posture and promote proper body mechanics. Progressed resistance, range, repetitions and complexity of movement as indicated. MANUAL THERAPY: (see below for minutes): Joint mobilization and Soft tissue mobilization was utilized and necessary because of the patient's restricted joint motion, painful spasm, loss of articular motion and restricted motion of soft tissue. MODALITIES: (see below for minutes):      to decrease pain    SELF CARE: (see below for minutes): Procedure(s) (per grid) utilized to improve and/or restore self-care/home management as related to dressing, bathing and grooming.  Required minimal verbal cueing to facilitate activities of daily living skills and compensatory activities.      Date: 12/14/2021  (Visit 14) 1-6-22  (Visit 15)  1-13-22  (Visit 16)  Recertification 9-12-35  (Visit 17)  2-1-22  (Visit 18)  2-7-22  (Visit 19)    Modalities:     15 mins     IFC with HP to mid thoracic      15 mins                       Therapeutic Exercise: 20 minutes 30 mins  45 mins  30 mins  30 mins  45 mins    Slantboard Gastroc Stretch  2 x :30  Repeat  slantboard  2x30SH   Repeat    Slantboard Soleus Stretch  2 x :30  Repeat  slantboard  2x30SH   Repeat    Standing DF Leaning Against Wall  2 x 10  Repeat        DL HR From Deficit 2 x 10         Split Lunge DF with Front Foot Elevated 2 x 10  Repeat        Split Stance PF         Anterior Step Downs 2 x 10  2x10 bilateral 6in  On slantboard 2x10 bilateral  Repeat      Bike warm up   5 mins  Treadmill x 5 mins with 15 incline (max)   Bike warm up  5 mins    bosu ball   2x10 bilateral step ups        Leg press       120# x10           x8           x6      Wobble board   Front/back  Lateral   Circles x 10 each  Repeat  Repeat x 15 reps each      marcela disc   SLS x30SH        Lateral eccentric heel taps   2x10 bilateral 6in step     Repeat    Seated heel raise    Black kettlebell x40 bilateral  Standing quick heel raises x 30      Heel raise on leg press    140# x10       Reverse lunge    With slider 2x10 bilateral  2x10 with slider bilateral   Repeat    HS curls with ball      2x10  2x10    LB rotations      x10  x10    Thoracic rotations with end range stretch SL      x10 bilateral  x10 bilateral    Prone thoracic lift with chin tuck      x10     Abdominal bracing with marching in supine      x10 bilateral  2x10 bilateral    Pelvic tilt      x10  x20    Standing thoracic rotation      x10  x10 bilaqteral    resissted side stepping       Grey band 2L    Bridging with resistance      Grey band 3x10     Lateral leap and hold    x10       Exaggerated walking    Forward and back 2L  Repeat back only Proprioceptive Activities: 15 minutes        BAPS: CW/CCW x1' ea. SL Airex 3-Way Slides         SL A/P, M/L Taps on Rockerboard x2' ea. Static SL Balance 3 x :30 Airex Repeat        BOSU Lunges                           Manual Therapy: 10 minutes 15 mins        GS IASTM With/without movement        Posterior Tib. STM With/without movement Manual stretching                   HEP: see 10/29/2021 flow sheet for specifics. "[employer] Portal  Treatment/Session Summary: Pt continues to show progress with strengthening and is encouraged to work on core training a little more independently. Communication/Consultation:  None today  Equipment provided today:  None today  Recommendations/Intent for next treatment session: Assess tolerance to break and need for continuance of PT.  F/U regarding MDV    Total Treatment Billable Duration:  45 minutes  PT Patient Time In/Time Out  Time In: 0051  Time Out: 3541 Ngoc Huang PT, DPT    Future Appointments   Date Time Provider Khadijah Valdez   2/7/2022 11:45 AM Robert Michel PT, DPT [facility]   2/10/2022  2:45 PM Robert Michel PT, DPT [facility]   2/14/2022 11:45 AM Robert Michel PT, DPT [facility]   2/17/2022  2:45 PM Robert Michel PT, DPT [facility]   2/25/2022  7:00 PM Robert Michel PT, DPT [facility]

## 2022-02-09 ENCOUNTER — APPOINTMENT (OUTPATIENT)
Dept: PHYSICAL THERAPY | Age: 22
End: 2022-02-09
Payer: COMMERCIAL

## 2022-02-10 ENCOUNTER — HOSPITAL ENCOUNTER (OUTPATIENT)
Dept: PHYSICAL THERAPY | Age: 22
Discharge: HOME | End: 2022-02-10
Payer: COMMERCIAL

## 2022-02-10 PROCEDURE — 97110 THERAPEUTIC EXERCISES: CPT

## 2022-02-10 NOTE — PROGRESS NOTES
Arline   : 2000  Primary: Douglas Hudson Rpn  Secondary:  [address] [facility] @ [address]  [address] [place] [address].  [phone]   [phone]      OUTPATIENT PHYSICAL THERAPY: Daily Treatment Note    2/10/2022    ICD-10: Treatment Diagnosis: Pain in right lower leg (M79.661), Pain in left lower leg (M79.662), Stiffness of right ankle, not elsewhere classified (M25.671) and Stiffness of left ankle, not elsewhere classified (E27.636)    Effective Dates: 22 to 2022 (30 days). Frequency/Duration: 2 times a week for 30 Days    GOALS: (Goals have been discussed and agreed upon with patient.)  Short-Term Functional Goals: Time Frame: 1 week  Patient will display independence with initial HEP to facilitate progress of condition. MET. Patient will note 10% improvement in capacity to walk uphill before onset of pain. MET. Discharge Goals: Time Frame: 4 weeks  Patient will display normalized ROM of bilateral ankles to allow for more efficient stress pattern during ambulation. MET. Patient will display independence with final HEP to continue to progress condition. MET. Patient will display capacity to perform 15 SL Heel raises bilaterally to indicate appropriate calf strength for community ambulation. MET. Patient will demonstrate capacity to walk for two minutes on a Treadmill at max incline without pain to demonstrate improved community ambulatory capacity. MET/   Pt able to perform all recreational activities and daily activities painfree in the gastrocs. (Progressing)       _________________________________________________________________________  Pre-treatment Symptoms/Complaints: \"Im feeling a little sore from the last visit. \"   Pain: Initial: 1/10 Post Session:  1/10   Medications Last Reviewed:  2/10/2022  Updated Objective Findings:  Below measures from 12/3/2021 unless otherwise indicated.    LEFS: 67/80    22 (76/80) Observation/Orthostatic Postural Assessment:    Patient gait cycle unremarkable. Palpation:    Diffusely increased tone bilaterally throughout gastroc-soleus complex. TTP at lateral head of left gastroc and bilateral posterior tib. muscle belly. ROM:    RLE:  CKC DF: 40 deg  PF: 55 deg  Inv.: 25 deg. Ev. 35 deg. LLE:  CKC DF: 40 deg  PF: 55 deg  Inv.: 25 deg. Ev. 35 deg. Strength:   RLE:  DF: 4+/5  PF: 4+/5 (20 SL HR*)  Inv.: 5/5  Ev. 5/5    LLE:  DF: 4+/5  PF: 4+/5 (20 SL HR*)  Inv.: 5/5  Ev. 5/5    Special Tests:    6MWT: 0.22 mile  Neurological Screen:  Sensation to light touch WNL Braulio. Functional Mobility:   Anterior Step Down Test: Max Reps in 30 seconds from 6 in. Step  R: 15 L: 16   Balance:    SL Airex Eyes Closed: Unshod  R: 5 sec. L: 3 sec. TREATMENT:   THERAPEUTIC ACTIVITY: ( see below for minutes): Therapeutic activities per grid below to improve mobility, strength, balance and coordination. Required minimal visual, verbal, manual and tactile cues to improve independence and safety with daily activities . THERAPEUTIC EXERCISE: (see below for minutes):  Exercises per grid below to improve mobility, strength, balance and coordination. Required minimal verbal and manual cues to promote proper body alignment, promote proper body posture and promote proper body mechanics. Progressed resistance, range, repetitions and complexity of movement as indicated. MANUAL THERAPY: (see below for minutes): Joint mobilization and Soft tissue mobilization was utilized and necessary because of the patient's restricted joint motion, painful spasm, loss of articular motion and restricted motion of soft tissue. MODALITIES: (see below for minutes):      to decrease pain    SELF CARE: (see below for minutes): Procedure(s) (per grid) utilized to improve and/or restore self-care/home management as related to dressing, bathing and grooming.  Required minimal verbal cueing to facilitate activities of daily living skills and compensatory activities.      Date: 12/14/2021  (Visit 14) 1-6-22  (Visit 15)  1-13-22  (Visit 16)  Recertification 0-38-01  (Visit 17)  2-1-22  (Visit 18)  2-7-22  (Visit 19)  2-10-22  (Visit 20)    Modalities:     15 mins      IFC with HP to mid thoracic      15 mins                          Therapeutic Exercise: 20 minutes 30 mins  45 mins  30 mins  30 mins  45 mins  45 mins    Slantboard Gastroc Stretch  2 x :30  Repeat  slantboard  2x30SH   Repeat     Slantboard Soleus Stretch  2 x :30  Repeat  slantboard  2x30SH   Repeat     Standing DF Leaning Against Wall  2 x 10  Repeat         DL HR From Deficit 2 x 10          Split Lunge DF with Front Foot Elevated 2 x 10  Repeat         Split Stance PF          Anterior Step Downs 2 x 10  2x10 bilateral 6in  On slantboard 2x10 bilateral  Repeat       Bike warm up   5 mins  Treadmill x 5 mins with 15 incline (max)   Bike warm up  5 mins  Repeat    bosu ball   2x10 bilateral step ups         Leg press       120# x10           x8           x6    100# 2x10    Wobble board   Front/back  Lateral   Circles x 10 each  Repeat  Repeat x 15 reps each       marcela disc   SLS x30SH         Lateral eccentric heel taps   2x10 bilateral 6in step     Repeat     Seated heel raise    Black kettlebell x40 bilateral  Standing quick heel raises x 30       Heel raise on leg press    140# x10        Reverse lunge    With slider 2x10 bilateral  2x10 with slider bilateral   Repeat  Repeat    HS curls with ball      2x10  2x10     LB rotations      x10  x10  Repeat    Thoracic rotations with end range stretch SL      x10 bilateral  x10 bilateral  Repeat    Prone thoracic lift with chin tuck      x10      renetta pose        2x15SH    Bird dog        2x5 bilateral    Abdominal bracing with marching in supine      x10 bilateral  2x10 bilateral  Repeat    Pelvic tilt      x10  x20  x20    Standing thoracic rotation      x10  x10 bilateral  Repeat    resissted side stepping Grey band 2L  Repeat    Bridging with resistance      Grey band 3x10   3x10 regular bridge    Lateral leap and hold    x10        Exaggerated walking    Forward and back 2L  Repeat back only       Proprioceptive Activities: 15 minutes         BAPS: CW/CCW x1' ea. SL Airex 3-Way Slides          SL A/P, M/L Taps on Rockerboard x2' ea. Static SL Balance 3 x :30 Airex Repeat         BOSU Lunges                              Manual Therapy: 10 minutes 15 mins         GS IASTM With/without movement         Posterior Tib. STM With/without movement Manual stretching                     HEP: see 10/29/2021 flow sheet for specifics. [employer]  Treatment/Session Summary: Pt was sore in the hamstrings and the adductors and the curls and the side stepping was limited today. Focus on core and stretching. Continue with POC. Communication/Consultation:  None today  Equipment provided today:  None today  Recommendations/Intent for next treatment session: Assess tolerance to break and need for continuance of PT.  F/U regarding MDV    Total Treatment Billable Duration:  45 minutes  PT Patient Time In/Time Out  Time In: 6177  Time Out: Sierra PT, DPT    Future Appointments   Date Time Provider Khadjiah Valdez   2/14/2022 11:45 AM Yvrose Guevara, PT, DPT [facility]   2/17/2022  2:45 PM Yvrose Guevara, PT, DPT [facility]   2/25/2022  7:00 PM Yvrose Guevara, PT, DPT [facility]

## 2022-02-14 ENCOUNTER — APPOINTMENT (OUTPATIENT)
Dept: PHYSICAL THERAPY | Age: 22
End: 2022-02-14
Payer: COMMERCIAL

## 2022-02-14 ENCOUNTER — HOSPITAL ENCOUNTER (OUTPATIENT)
Dept: PHYSICAL THERAPY | Age: 22
Discharge: HOME | End: 2022-02-14
Payer: COMMERCIAL

## 2022-02-14 PROCEDURE — 97110 THERAPEUTIC EXERCISES: CPT

## 2022-02-14 NOTE — PROGRESS NOTES
Arline Turner  : 2000  Primary: Raman Harmon Becca Overton  Secondary:  [address] @ [place] [address].  [phone]   [phone]      OUTPATIENT PHYSICAL THERAPY: Daily Treatment Note  And Recertification      ICD-10: Treatment Diagnosis: Pain in right lower leg (M79.661), Pain in left lower leg (M79.662), Stiffness of right ankle, not elsewhere classified (M25.671) and Stiffness of left ankle, not elsewhere classified (D77.697)    Effective Dates: 22 to 3/16/2022 (30 days). Frequency/Duration: 2 times a week for 30 Days    GOALS: (Goals have been discussed and agreed upon with patient.)  Short-Term Functional Goals: Time Frame: 1 week  Patient will display independence with initial HEP to facilitate progress of condition. MET. Patient will note 10% improvement in capacity to walk uphill before onset of pain. MET. Discharge Goals: Time Frame: 4 weeks  Patient will display normalized ROM of bilateral ankles to allow for more efficient stress pattern during ambulation. MET. Patient will display independence with final HEP to continue to progress condition. MET. Patient will display capacity to perform 15 SL Heel raises bilaterally to indicate appropriate calf strength for community ambulation. MET. Patient will demonstrate capacity to walk for two minutes on a Treadmill at max incline without pain to demonstrate improved community ambulatory capacity. MET/   Pt able to perform all recreational activities and daily activities painfree in the gastrocs. (Progressing)     Regarding Arline Turner's therapy, I certify that the treatment plan above will be carried out by a therapist or under their direction.   Thank you for this referral,  Aleja Multani, PT, DPT     Referring Physician Signature: Yesenia Garcia, * _______________________________ Date _____________        _________________________________________________________________________  Pre-treatment Symptoms/Complaints: \"Im feeling good in my back. \"   Pain: Initial: 1/10 Post Session:  1/10   Medications Last Reviewed:  2/14/2022  Updated Objective Findings:  Below measures from 12/3/2021 unless otherwise indicated. LEFS: 67/80    1-13-22 (76/80)     Observation/Orthostatic Postural Assessment:    Patient gait cycle unremarkable. Palpation:    Diffusely increased tone bilaterally throughout gastroc-soleus complex. TTP at lateral head of left gastroc and bilateral posterior tib. muscle belly. ROM:    RLE:  CKC DF: 40 deg  PF: 55 deg  Inv.: 25 deg. Ev. 35 deg. LLE:  CKC DF: 40 deg  PF: 55 deg  Inv.: 25 deg. Ev. 35 deg. Strength:   RLE:  DF: 4+/5  PF: 4+/5 (20 SL HR*)  Inv.: 5/5  Ev. 5/5    LLE:  DF: 4+/5  PF: 4+/5 (20 SL HR*)  Inv.: 5/5  Ev. 5/5    Special Tests:    6MWT: 0.22 mile  Neurological Screen:  Sensation to light touch WNL Braulio. Functional Mobility:   Anterior Step Down Test: Max Reps in 30 seconds from 6 in. Step  R: 15 L: 16   Balance:    SL Airex Eyes Closed: Unshod  R: 5 sec. L: 3 sec. TREATMENT:   THERAPEUTIC ACTIVITY: ( see below for minutes): Therapeutic activities per grid below to improve mobility, strength, balance and coordination. Required minimal visual, verbal, manual and tactile cues to improve independence and safety with daily activities . THERAPEUTIC EXERCISE: (see below for minutes):  Exercises per grid below to improve mobility, strength, balance and coordination. Required minimal verbal and manual cues to promote proper body alignment, promote proper body posture and promote proper body mechanics. Progressed resistance, range, repetitions and complexity of movement as indicated.   MANUAL THERAPY: (see below for minutes): Joint mobilization and Soft tissue mobilization was utilized and necessary because of the patient's restricted joint motion, painful spasm, loss of articular motion and restricted motion of soft tissue. MODALITIES: (see below for minutes):      to decrease pain    SELF CARE: (see below for minutes): Procedure(s) (per grid) utilized to improve and/or restore self-care/home management as related to dressing, bathing and grooming. Required minimal verbal cueing to facilitate activities of daily living skills and compensatory activities.      Date: 12/14/2021  (Visit 14) 1-6-22  (Visit 15)  1-13-22  (Visit 16)  Recertification 8-12-82  (Visit 17)  2-1-22  (Visit 18)  2-7-22  (Visit 19)  2-10-22  (Visit 20)  2-14-22  (Visit 21)    Modalities:     15 mins       IFC with HP to mid thoracic      15 mins                             Therapeutic Exercise: 20 minutes 30 mins  45 mins  30 mins  30 mins  45 mins  45 mins  45 mins    Slantboard Gastroc Stretch  2 x :30  Repeat  slantboard  2x30SH   Repeat   x30SH    Slantboard Soleus Stretch  2 x :30  Repeat  slantboard  2x30SH   Repeat   x30SH    Standing DF Leaning Against Wall  2 x 10  Repeat          DL HR From Deficit 2 x 10           Split Lunge DF with Front Foot Elevated 2 x 10  Repeat          Split Stance PF           Anterior Step Downs 2 x 10  2x10 bilateral 6in  On slantboard 2x10 bilateral  Repeat        Bike warm up   5 mins  Treadmill x 5 mins with 15 incline (max)   Bike warm up  5 mins  Repeat  5 mins    bosu ball   2x10 bilateral step ups          Leg press       120# x10           x8           x6    100# 2x10     Wobble board   Front/back  Lateral   Circles x 10 each  Repeat  Repeat x 15 reps each        marcela disc   SLS x30SH          Lateral eccentric heel taps   2x10 bilateral 6in step     Repeat      Seated heel raise    Black kettlebell x40 bilateral  Standing quick heel raises x 30        Heel raise on leg press    140# x10         Reverse lunge    With slider 2x10 bilateral  2x10 with slider bilateral   Repeat  Repeat  Repeat    HS curls with ball      2x10  2x10      LB rotations      x10  x10  Repeat  Repeat    Thoracic rotations with end range stretch SL      x10 bilateral  x10 bilateral  Repeat  Repeat    Prone thoracic lift with chin tuck      x10       renetta pose        2x15SH  Repeat    Bird dog        2x5 bilateral  Repeat with eugene for balance    Abdominal bracing with marching in supine      x10 bilateral  2x10 bilateral  Repeat  Repeat with blue band for resistance    Pelvic tilt      x10  x20  x20  Repeat    Standing thoracic rotation      x10  x10 bilateral  Repeat     resissted side stepping       Grey band 2L  Repeat     Bridging with resistance      Grey band 3x10   3x10 regular bridge  SL bridge 2x10    HS stretch with strap         4x15SH bilateral    RDLs         Red kettlebell double handed 2x10 bilateral    SL clams         x20    Lateral leap and hold    x10         Exaggerated walking    Forward and back 2L  Repeat back only        Proprioceptive Activities: 15 minutes          BAPS: CW/CCW x1' ea. SL Airex 3-Way Slides           SL A/P, M/L Taps on Rockerboard x2' ea. Static SL Balance 3 x :30 Airex Repeat          BOSU Lunges                                   HEP: see 10/29/2021 flow sheet for specifics. [employer] Portal  Treatment/Session Summary: Pt continues to have some difficulty with core strength and she is improving with ambulation and endurance. She is not having a lot of back pain today so focus on strengthening. Communication/Consultation:  None today  Equipment provided today:  None today  Recommendations/Intent for next treatment session: Assess tolerance to break and need for continuance of PT.  F/U regarding MDV    Total Treatment Billable Duration:  45 minutes  PT Patient Time In/Time Out  Time In: 7595  Time Out: 1381 Ngoc Huang, PT, DPT    Future Appointments   Date Time Provider Khadijah Valdez   2/17/2022  2:45 PM Bairon Casarez, PT, DPT [facility]   2/25/2022  7:00 PM Bairon Casarez, PT, DPT [facility]   3/2/2022 11:45 AM Onnie Cera, PT, DPT [facility]   3/7/2022 11:45 AM Onnie Cera, PT, DPT [facility]   3/9/2022 11:45 AM Onnie Cera, PT, DPT [facility]   3/14/2022 11:45 AM Onnie Cera, PT, DPT [facility]   3/16/2022 11:45 AM Onnie Cera, PT, DPT [facility]   3/21/2022 11:45 AM Onnie Cera, PT, DPT [facility]   3/23/2022 11:45 AM Onnie Cera, PT, DPT [facility]   3/28/2022 11:45 AM Onnie Cera, PT, DPT [facility]   3/30/2022 11:45 AM Onnie Cera, PT, DPT [facility]

## 2022-02-16 ENCOUNTER — APPOINTMENT (OUTPATIENT)
Dept: PHYSICAL THERAPY | Age: 22
End: 2022-02-16
Payer: COMMERCIAL

## 2022-02-17 ENCOUNTER — HOSPITAL ENCOUNTER (OUTPATIENT)
Dept: PHYSICAL THERAPY | Age: 22
Discharge: HOME | End: 2022-02-17
Payer: COMMERCIAL

## 2022-02-17 PROCEDURE — 97110 THERAPEUTIC EXERCISES: CPT

## 2022-02-17 NOTE — PROGRESS NOTES
Arline   : 2000  Primary: Garret Hudson Rpn  Secondary:  [address] [facility] @ [address] [place] [address]  [phone]   [phone]      OUTPATIENT PHYSICAL THERAPY: Daily Treatment Note  2022    ICD-10: Treatment Diagnosis: Pain in right lower leg (M79.661), Pain in left lower leg (M79.662), Stiffness of right ankle, not elsewhere classified (M25.671) and Stiffness of left ankle, not elsewhere classified (H99.689)    Effective Dates: 22 to 3/16/2022 (30 days). Frequency/Duration: 2 times a week for 30 Days    GOALS: (Goals have been discussed and agreed upon with patient.)  Short-Term Functional Goals: Time Frame: 1 week  Patient will display independence with initial HEP to facilitate progress of condition. MET. Patient will note 10% improvement in capacity to walk uphill before onset of pain. MET. Discharge Goals: Time Frame: 4 weeks  Patient will display normalized ROM of bilateral ankles to allow for more efficient stress pattern during ambulation. MET. Patient will display independence with final HEP to continue to progress condition. MET. Patient will display capacity to perform 15 SL Heel raises bilaterally to indicate appropriate calf strength for community ambulation. MET. Patient will demonstrate capacity to walk for two minutes on a Treadmill at max incline without pain to demonstrate improved community ambulatory capacity. MET/   Pt able to perform all recreational activities and daily activities painfree in the gastrocs. (Progressing)         _________________________________________________________________________  Pre-treatment Symptoms/Complaints: \"My back is feeling good but my knee started hurting me again. \"   Pain: Initial: 1/10 Post Session:  1/10   Medications Last Reviewed:  2022  Updated Objective Findings:  Below measures from 12/3/2021 unless otherwise indicated.    LEFS: 67/80    22 (76/80) Observation/Orthostatic Postural Assessment:    Patient gait cycle unremarkable. Palpation:    Diffusely increased tone bilaterally throughout gastroc-soleus complex. TTP at lateral head of left gastroc and bilateral posterior tib. muscle belly. ROM:    RLE:  CKC DF: 40 deg  PF: 55 deg  Inv.: 25 deg. Ev. 35 deg. LLE:  CKC DF: 40 deg  PF: 55 deg  Inv.: 25 deg. Ev. 35 deg. Strength:   RLE:  DF: 4+/5  PF: 4+/5 (20 SL HR*)  Inv.: 5/5  Ev. 5/5    LLE:  DF: 4+/5  PF: 4+/5 (20 SL HR*)  Inv.: 5/5  Ev. 5/5    Special Tests:    6MWT: 0.22 mile  Neurological Screen:  Sensation to light touch WNL Braulio. Functional Mobility:   Anterior Step Down Test: Max Reps in 30 seconds from 6 in. Step  R: 15 L: 16   Balance:    SL Airex Eyes Closed: Unshod  R: 5 sec. L: 3 sec. TREATMENT:   THERAPEUTIC ACTIVITY: ( see below for minutes): Therapeutic activities per grid below to improve mobility, strength, balance and coordination. Required minimal visual, verbal, manual and tactile cues to improve independence and safety with daily activities . THERAPEUTIC EXERCISE: (see below for minutes):  Exercises per grid below to improve mobility, strength, balance and coordination. Required minimal verbal and manual cues to promote proper body alignment, promote proper body posture and promote proper body mechanics. Progressed resistance, range, repetitions and complexity of movement as indicated. MANUAL THERAPY: (see below for minutes): Joint mobilization and Soft tissue mobilization was utilized and necessary because of the patient's restricted joint motion, painful spasm, loss of articular motion and restricted motion of soft tissue. MODALITIES: (see below for minutes):      to decrease pain    SELF CARE: (see below for minutes): Procedure(s) (per grid) utilized to improve and/or restore self-care/home management as related to dressing, bathing and grooming.  Required minimal verbal cueing to facilitate activities of daily living skills and compensatory activities.      Date: 12/14/2021  (Visit 14) 1-6-22  (Visit 15)  1-13-22  (Visit 16)  Recertification 4-54-29  (Visit 17)  2-1-22  (Visit 18)  2-7-22  (Visit 19)  2-10-22  (Visit 20)  2-14-22  (Visit 21)  2-17-22  (Visit 22)    Modalities:     15 mins        IFC with HP to mid thoracic      15 mins                                Therapeutic Exercise: 20 minutes 30 mins  45 mins  30 mins  30 mins  45 mins  45 mins  45 mins  45 mins    Slantboard Gastroc Stretch  2 x :30  Repeat  slantboard  2x30SH   Repeat   x30SH  Repeat    Slantboard Soleus Stretch  2 x :30  Repeat  slantboard  2x30SH   Repeat   x30SH  Repeat    Standing DF Leaning Against Wall  2 x 10  Repeat           DL HR From Deficit 2 x 10            Split Lunge DF with Front Foot Elevated 2 x 10  Repeat           Split Stance PF            Anterior Step Downs 2 x 10  2x10 bilateral 6in  On slantboard 2x10 bilateral  Repeat         Bike warm up   5 mins  Treadmill x 5 mins with 15 incline (max)   Bike warm up  5 mins  Repeat  5 mins  Repeat    bosu ball   2x10 bilateral step ups           Leg press       120# x10           x8           x6    100# 2x10      Wobble board   Front/back  Lateral   Circles x 10 each  Repeat  Repeat x 15 reps each         marcela disc   SLS x30SH           Lateral eccentric heel taps   2x10 bilateral 6in step     Repeat    2x10 6 in step bilateral    Seated heel raise    Black kettlebell x40 bilateral  Standing quick heel raises x 30         Heel raise on leg press    140# x10          Reverse lunge    With slider 2x10 bilateral  2x10 with slider bilateral   Repeat  Repeat  Repeat  Not on sliders 2x10 with 8# ball    HS curls with ball      2x10  2x10       LB rotations      x10  x10  Repeat  Repeat     Thoracic rotations with end range stretch SL      x10 bilateral  x10 bilateral  Repeat  Repeat     Prone thoracic lift with chin tuck      x10        renetta pose        2x15SH  Repeat     Bird dog 2x5 bilateral  Repeat with eugene for balance     Abdominal bracing with marching in supine      x10 bilateral  2x10 bilateral  Repeat  Repeat with blue band for resistance     Pelvic tilt      x10  x20  x20  Repeat     Standing thoracic rotation      x10  x10 bilateral  Repeat      resissted side stepping       Grey band 2L  Repeat   Repeat    Bridging with resistance      Grey band 3x10   3x10 regular bridge  SL bridge 2x10  Repeat    HS stretch with strap         4x15SH bilateral  Repeat    SLS on foam toe tapping          2x10 bilateral    RDLs         Red kettlebell double handed 2x10 bilateral     SL clams         x20     Lateral leap and hold    x10          Exaggerated walking    Forward and back 2L  Repeat back only           HEP: see 10/29/2021 flow sheet for specifics. [employer] Portal  Treatment/Session Summary: Pt to continue for a few more visits to work on strengthening and stretching. Communication/Consultation:  None today  Equipment provided today:  None today  Recommendations/Intent for next treatment session: Assess tolerance to break and need for continuance of PT.  F/U regarding MDV    Total Treatment Billable Duration:  45 minutes  PT Patient Time In/Time Out  Time In: 9194  Time Out: Sierra PT, DPT    Future Appointments   Date Time Provider Khadijah Valdez   2/17/2022  2:45 PM Randy Tariq PT, DPT [facility]   2/25/2022  7:00 PM Randy Tariq PT, DPT [facility]

## 2022-02-21 ENCOUNTER — APPOINTMENT (OUTPATIENT)
Dept: PHYSICAL THERAPY | Age: 22
End: 2022-02-21
Payer: COMMERCIAL

## 2022-02-22 ENCOUNTER — HOSPITAL ENCOUNTER (OUTPATIENT)
Dept: PHYSICAL THERAPY | Age: 22
Discharge: HOME | End: 2022-02-22
Payer: COMMERCIAL

## 2022-02-22 PROCEDURE — 97110 THERAPEUTIC EXERCISES: CPT

## 2022-02-22 NOTE — THERAPY DISCHARGE
Arline   : 2000  Primary: Douglas Hudson Rpn  Secondary:  [address] @ [address], [address] [place] [address]  [phone]   [phone]      OUTPATIENT PHYSICAL THERAPY: Daily Treatment Note and Discharge   2022    ICD-10: Treatment Diagnosis: Pain in right lower leg (M79.661), Pain in left lower leg (M79.662), Stiffness of right ankle, not elsewhere classified (M25.671) and Stiffness of left ankle, not elsewhere classified (H84.842)    Effective Dates: 22 to 3/16/2022 (30 days). Frequency/Duration: 2 times a week for 30 Days    GOALS: (Goals have been discussed and agreed upon with patient.)  Short-Term Functional Goals: Time Frame: 1 week  1. Patient will display independence with initial HEP to facilitate progress of condition. MET. 2. Patient will note 10% improvement in capacity to walk uphill before onset of pain. MET. Discharge Goals: Time Frame: 4 weeks  1. Patient will display normalized ROM of bilateral ankles to allow for more efficient stress pattern during ambulation. MET. 2. Patient will display independence with final HEP to continue to progress condition. MET. 3. Patient will display capacity to perform 15 SL Heel raises bilaterally to indicate appropriate calf strength for community ambulation. MET. 4. Patient will demonstrate capacity to walk for two minutes on a Treadmill at max incline without pain to demonstrate improved community ambulatory capacity. MET/   5. Pt able to perform all recreational activities and daily activities painfree in the gastrocs. (MET)        _________________________________________________________________________  Pre-treatment Symptoms/Complaints: \"Im feeling pretty good. My knee is a little sore but not terrible. \"   Pain: Initial: 1/10 Post Session:  1/10   Medications Last Reviewed:  2022  Updated Objective Findings:  Below measures from 12/3/2021 unless otherwise indicated. LEFS: 67/80    1-13-22 (76/80)     Observation/Orthostatic Postural Assessment:    Patient gait cycle unremarkable. Palpation:    Diffusely increased tone bilaterally throughout gastroc-soleus complex. TTP at lateral head of left gastroc and bilateral posterior tib. muscle belly. ROM:    RLE:  CKC DF: 40 deg  PF: 55 deg  Inv.: 25 deg. Ev. 35 deg. LLE:  CKC DF: 40 deg  PF: 55 deg  Inv.: 25 deg. Ev. 35 deg. Strength:   RLE:  DF: 4+/5  PF: 4+/5 (20 SL HR*)  Inv.: 5/5  Ev. 5/5    LLE:  DF: 4+/5  PF: 4+/5 (20 SL HR*)  Inv.: 5/5  Ev. 5/5    Special Tests:    6MWT: 0.22 mile  Neurological Screen:  Sensation to light touch WNL Braulio. Functional Mobility:   Anterior Step Down Test: Max Reps in 30 seconds from 6 in. Step  R: 15 L: 16   Balance:    SL Airex Eyes Closed: Unshod  R: 5 sec. L: 3 sec. TREATMENT:   THERAPEUTIC ACTIVITY: ( see below for minutes): Therapeutic activities per grid below to improve mobility, strength, balance and coordination. Required minimal visual, verbal, manual and tactile cues to improve independence and safety with daily activities . THERAPEUTIC EXERCISE: (see below for minutes):  Exercises per grid below to improve mobility, strength, balance and coordination. Required minimal verbal and manual cues to promote proper body alignment, promote proper body posture and promote proper body mechanics. Progressed resistance, range, repetitions and complexity of movement as indicated. MANUAL THERAPY: (see below for minutes): Joint mobilization and Soft tissue mobilization was utilized and necessary because of the patient's restricted joint motion, painful spasm, loss of articular motion and restricted motion of soft tissue. MODALITIES: (see below for minutes):      to decrease pain    SELF CARE: (see below for minutes): Procedure(s) (per grid) utilized to improve and/or restore self-care/home management as related to dressing, bathing and grooming.  Required minimal verbal cueing to facilitate activities of daily living skills and compensatory activities.      Date: 12/14/2021  (Visit 14) 1-6-22  (Visit 15)  1-13-22  (Visit 16)  Recertification 6-99-95  (Visit 17)  2-1-22  (Visit 18)  2-7-22  (Visit 19)  2-10-22  (Visit 20)  2-14-22  (Visit 21)  2-17-22  (Visit 22)  2-22-22  (Visit 23)  D/C    Modalities:     15 mins         IFC with HP to mid thoracic      15 mins                                   Therapeutic Exercise: 20 minutes 30 mins  45 mins  30 mins  30 mins  45 mins  45 mins  45 mins  45 mins  45 mins    Slantboard Gastroc Stretch  2 x :30  Repeat  slantboard  2x30SH   Repeat   x30SH  Repeat  x30SH    Slantboard Soleus Stretch  2 x :30  Repeat  slantboard  2x30SH   Repeat   x30SH  Repeat  x30SH    Standing DF Leaning Against Wall  2 x 10  Repeat            DL HR From Deficit 2 x 10             Split Lunge DF with Front Foot Elevated 2 x 10  Repeat            Split Stance PF             Anterior Step Downs 2 x 10  2x10 bilateral 6in  On slantboard 2x10 bilateral  Repeat       Step ups 6 in with high knee 2x10 bilateral    Bike warm up   5 mins  Treadmill x 5 mins with 15 incline (max)   Bike warm up  5 mins  Repeat  5 mins  Repeat  5 mins    bosu ball   2x10 bilateral step ups            Leg press       120# x10           x8           x6    100# 2x10       Wobble board   Front/back  Lateral   Circles x 10 each  Repeat  Repeat x 15 reps each          marcela disc   SLS x30SH            Lateral eccentric heel taps   2x10 bilateral 6in step     Repeat    2x10 6 in step bilateral  Repeat    Seated heel raise    Black kettlebell x40 bilateral  Standing quick heel raises x 30          Heel raise on leg press    140# x10           Reverse lunge    With slider 2x10 bilateral  2x10 with slider bilateral   Repeat  Repeat  Repeat  Not on sliders 2x10 with 8# ball     HS curls with ball      2x10  2x10        LB rotations      x10  x10  Repeat  Repeat   x10    Thoracic rotations with end range stretch SL      x10 bilateral  x10 bilateral  Repeat  Repeat   x10 bilateral    Prone thoracic lift with chin tuck      x10         renetta pose        2x15SH  Repeat      Bird dog        2x5 bilateral  Repeat with eugene for balance      Abdominal bracing with marching in supine      x10 bilateral  2x10 bilateral  Repeat  Repeat with blue band for resistance      Pelvic tilt      x10  x20  x20  Repeat   x10    Standing thoracic rotation      x10  x10 bilateral  Repeat       resissted side stepping       Grey band 2L  Repeat   Repeat  2L    Bridging with resistance      Grey band 3x10   3x10 regular bridge  SL bridge 2x10  Repeat  2x10 bilateral    HS stretch with strap         4x15SH bilateral  Repeat  Repeat    SLS on foam toe tapping          2x10 bilateral     RDLs         Red kettlebell double handed 2x10 bilateral      SL clams         x20      Lateral leap and hold    x10           Exaggerated walking    Forward and back 2L  Repeat back only            HEP: see 10/29/2021 flow sheet for specifics.     [facility] Portal  Treatment/Session Summary: pt is going OOT for 2 weeks with basketball and then spring break so we will be discharging to HEP   Total Treatment Billable Duration:  45 minutes  PT Patient Time In/Time Out  Time In: 2276  Time Out: 3001 Jacob Barcenas, PT, DPT    Future Appointments   Date Time Provider Khadijah Valdez   2/25/2022  7:00 PM Stevie Chand, PT, DPT [facility]

## 2022-02-23 ENCOUNTER — APPOINTMENT (OUTPATIENT)
Dept: PHYSICAL THERAPY | Age: 22
End: 2022-02-23
Payer: COMMERCIAL

## 2022-02-25 ENCOUNTER — APPOINTMENT (OUTPATIENT)
Dept: PHYSICAL THERAPY | Age: 22
End: 2022-02-25
Payer: COMMERCIAL

## 2022-02-28 ENCOUNTER — APPOINTMENT (OUTPATIENT)
Dept: PHYSICAL THERAPY | Age: 22
End: 2022-02-28
Payer: COMMERCIAL

## 2022-03-02 ENCOUNTER — APPOINTMENT (OUTPATIENT)
Dept: PHYSICAL THERAPY | Age: 22
End: 2022-03-02

## 2022-03-07 ENCOUNTER — APPOINTMENT (OUTPATIENT)
Dept: PHYSICAL THERAPY | Age: 22
End: 2022-03-07

## 2022-03-09 ENCOUNTER — APPOINTMENT (OUTPATIENT)
Dept: PHYSICAL THERAPY | Age: 22
End: 2022-03-09

## 2022-03-14 ENCOUNTER — APPOINTMENT (OUTPATIENT)
Dept: PHYSICAL THERAPY | Age: 22
End: 2022-03-14

## 2022-03-16 ENCOUNTER — APPOINTMENT (OUTPATIENT)
Dept: PHYSICAL THERAPY | Age: 22
End: 2022-03-16

## 2022-03-21 ENCOUNTER — APPOINTMENT (OUTPATIENT)
Dept: PHYSICAL THERAPY | Age: 22
End: 2022-03-21

## 2022-03-23 ENCOUNTER — APPOINTMENT (OUTPATIENT)
Dept: PHYSICAL THERAPY | Age: 22
End: 2022-03-23

## 2022-03-28 ENCOUNTER — APPOINTMENT (OUTPATIENT)
Dept: PHYSICAL THERAPY | Age: 22
End: 2022-03-28

## 2022-03-30 ENCOUNTER — APPOINTMENT (OUTPATIENT)
Dept: PHYSICAL THERAPY | Age: 22
End: 2022-03-30